# Patient Record
Sex: MALE | Race: WHITE | NOT HISPANIC OR LATINO | Employment: OTHER | URBAN - METROPOLITAN AREA
[De-identification: names, ages, dates, MRNs, and addresses within clinical notes are randomized per-mention and may not be internally consistent; named-entity substitution may affect disease eponyms.]

---

## 2018-09-11 ENCOUNTER — HOSPITAL ENCOUNTER (EMERGENCY)
Facility: HOSPITAL | Age: 43
Discharge: HOME | End: 2018-09-11
Attending: EMERGENCY MEDICINE | Admitting: EMERGENCY MEDICINE

## 2018-09-11 VITALS
HEART RATE: 80 BPM | SYSTOLIC BLOOD PRESSURE: 140 MMHG | OXYGEN SATURATION: 99 % | BODY MASS INDEX: 30.95 KG/M2 | WEIGHT: 228.5 LBS | HEIGHT: 72 IN | DIASTOLIC BLOOD PRESSURE: 92 MMHG | RESPIRATION RATE: 16 BRPM | TEMPERATURE: 98.2 F

## 2018-09-11 DIAGNOSIS — I47.1 SVT (SUPRAVENTRICULAR TACHYCARDIA) (HCC): Primary | ICD-10-CM

## 2018-09-11 LAB
ALBUMIN SERPL-MCNC: 4 G/DL (ref 3.5–5.2)
ALBUMIN/GLOB SERPL: 1.6 G/DL
ALP SERPL-CCNC: 73 U/L (ref 39–117)
ALT SERPL W P-5'-P-CCNC: 21 U/L (ref 1–41)
ANION GAP SERPL CALCULATED.3IONS-SCNC: 10 MMOL/L
AST SERPL-CCNC: 23 U/L (ref 1–40)
BASOPHILS # BLD AUTO: 0.05 10*3/MM3 (ref 0–0.2)
BASOPHILS NFR BLD AUTO: 0.4 % (ref 0–1.5)
BILIRUB SERPL-MCNC: 0.3 MG/DL (ref 0.1–1.2)
BUN BLD-MCNC: 11 MG/DL (ref 6–20)
BUN/CREAT SERPL: 10.5 (ref 7–25)
CALCIUM SPEC-SCNC: 8.7 MG/DL (ref 8.6–10.5)
CHLORIDE SERPL-SCNC: 108 MMOL/L (ref 98–107)
CO2 SERPL-SCNC: 25 MMOL/L (ref 22–29)
CREAT BLD-MCNC: 1.05 MG/DL (ref 0.76–1.27)
DEPRECATED RDW RBC AUTO: 43.1 FL (ref 37–54)
EOSINOPHIL # BLD AUTO: 0.17 10*3/MM3 (ref 0–0.7)
EOSINOPHIL NFR BLD AUTO: 1.4 % (ref 0.3–6.2)
ERYTHROCYTE [DISTWIDTH] IN BLOOD BY AUTOMATED COUNT: 12.5 % (ref 11.5–14.5)
GFR SERPL CREATININE-BSD FRML MDRD: 78 ML/MIN/1.73
GLOBULIN UR ELPH-MCNC: 2.5 GM/DL
GLUCOSE BLD-MCNC: 108 MG/DL (ref 65–99)
HCT VFR BLD AUTO: 42.6 % (ref 40.4–52.2)
HGB BLD-MCNC: 13.7 G/DL (ref 13.7–17.6)
IMM GRANULOCYTES # BLD: 0.07 10*3/MM3 (ref 0–0.03)
IMM GRANULOCYTES NFR BLD: 0.6 % (ref 0–0.5)
LYMPHOCYTES # BLD AUTO: 1.88 10*3/MM3 (ref 0.9–4.8)
LYMPHOCYTES NFR BLD AUTO: 15.9 % (ref 19.6–45.3)
MAGNESIUM SERPL-MCNC: 2.2 MG/DL (ref 1.6–2.6)
MCH RBC QN AUTO: 30.6 PG (ref 27–32.7)
MCHC RBC AUTO-ENTMCNC: 32.2 G/DL (ref 32.6–36.4)
MCV RBC AUTO: 95.1 FL (ref 79.8–96.2)
MONOCYTES # BLD AUTO: 0.71 10*3/MM3 (ref 0.2–1.2)
MONOCYTES NFR BLD AUTO: 6 % (ref 5–12)
NEUTROPHILS # BLD AUTO: 8.98 10*3/MM3 (ref 1.9–8.1)
NEUTROPHILS NFR BLD AUTO: 75.7 % (ref 42.7–76)
PLATELET # BLD AUTO: 269 10*3/MM3 (ref 140–500)
PMV BLD AUTO: 10.2 FL (ref 6–12)
POTASSIUM BLD-SCNC: 4.1 MMOL/L (ref 3.5–5.2)
PROT SERPL-MCNC: 6.5 G/DL (ref 6–8.5)
RBC # BLD AUTO: 4.48 10*6/MM3 (ref 4.6–6)
SODIUM BLD-SCNC: 143 MMOL/L (ref 136–145)
T4 FREE SERPL-MCNC: 0.82 NG/DL (ref 0.93–1.7)
TROPONIN T SERPL-MCNC: <0.01 NG/ML (ref 0–0.03)
TSH SERPL DL<=0.05 MIU/L-ACNC: 14.31 MIU/ML (ref 0.27–4.2)
WBC NRBC COR # BLD: 11.86 10*3/MM3 (ref 4.5–10.7)

## 2018-09-11 PROCEDURE — 84484 ASSAY OF TROPONIN QUANT: CPT | Performed by: PHYSICIAN ASSISTANT

## 2018-09-11 PROCEDURE — 80053 COMPREHEN METABOLIC PANEL: CPT | Performed by: PHYSICIAN ASSISTANT

## 2018-09-11 PROCEDURE — 84439 ASSAY OF FREE THYROXINE: CPT | Performed by: EMERGENCY MEDICINE

## 2018-09-11 PROCEDURE — 96374 THER/PROPH/DIAG INJ IV PUSH: CPT

## 2018-09-11 PROCEDURE — 99284 EMERGENCY DEPT VISIT MOD MDM: CPT

## 2018-09-11 PROCEDURE — 93010 ELECTROCARDIOGRAM REPORT: CPT | Performed by: INTERNAL MEDICINE

## 2018-09-11 PROCEDURE — 83735 ASSAY OF MAGNESIUM: CPT | Performed by: PHYSICIAN ASSISTANT

## 2018-09-11 PROCEDURE — 84443 ASSAY THYROID STIM HORMONE: CPT | Performed by: EMERGENCY MEDICINE

## 2018-09-11 PROCEDURE — 93005 ELECTROCARDIOGRAM TRACING: CPT

## 2018-09-11 PROCEDURE — 85025 COMPLETE CBC W/AUTO DIFF WBC: CPT | Performed by: PHYSICIAN ASSISTANT

## 2018-09-11 PROCEDURE — 96361 HYDRATE IV INFUSION ADD-ON: CPT

## 2018-09-11 PROCEDURE — 93005 ELECTROCARDIOGRAM TRACING: CPT | Performed by: EMERGENCY MEDICINE

## 2018-09-11 RX ORDER — LISINOPRIL 20 MG/1
20 TABLET ORAL 2 TIMES DAILY
COMMUNITY
End: 2018-09-11

## 2018-09-11 RX ORDER — METOPROLOL SUCCINATE 25 MG/1
25 TABLET, EXTENDED RELEASE ORAL DAILY
Qty: 30 TABLET | Refills: 0 | Status: SHIPPED | OUTPATIENT
Start: 2018-09-11 | End: 2018-09-21

## 2018-09-11 RX ORDER — LISINOPRIL 20 MG/1
20 TABLET ORAL 2 TIMES DAILY
Qty: 60 TABLET | Refills: 0 | Status: SHIPPED | OUTPATIENT
Start: 2018-09-11 | End: 2018-09-21

## 2018-09-11 RX ORDER — SODIUM CHLORIDE 0.9 % (FLUSH) 0.9 %
10 SYRINGE (ML) INJECTION AS NEEDED
Status: DISCONTINUED | OUTPATIENT
Start: 2018-09-11 | End: 2018-09-11 | Stop reason: HOSPADM

## 2018-09-11 RX ADMIN — METOPROLOL TARTRATE 5 MG: 1 INJECTION, SOLUTION INTRAVENOUS at 08:02

## 2018-09-11 RX ADMIN — SODIUM CHLORIDE 500 ML: 9 INJECTION, SOLUTION INTRAVENOUS at 08:01

## 2018-09-11 NOTE — ED PROVIDER NOTES
EMERGENCY DEPARTMENT ENCOUNTER    CHIEF COMPLAINT  Chief Complaint: Palpitations   History given by: patient   History limited by: n/a  Room Number: 12/12  PMD: Provider, No Known      HPI:  Pt is a 42 y.o. male who presents complaining of palpitations that began this morning at 02:30. Pt states that he has a hx of SVT and WPW, and is typically able to convert himself out of SVT with bearing down. Pt states that he attempted that this morning, but did not convert. Pt was given 12 mg of Adenosine with conversion. He denies associated CP, SOA, or any other sx. Pt states that he had 2 cappuccinos last night.     Duration:  3 hours   Onset: sudden  Timing: constant   Location: cardiac  Radiation: n/a  Quality: rapid HR  Intensity/Severity: moderate  Progression: resolved   Associated Symptoms: none  Aggravating Factors: caffeine   Alleviating Factors: Adenosine  Previous Episodes: Hx of SVT and WPW  Treatment before arrival: 12 mg of Adenosine.     PAST MEDICAL HISTORY  Active Ambulatory Problems     Diagnosis Date Noted   • No Active Ambulatory Problems     Resolved Ambulatory Problems     Diagnosis Date Noted   • No Resolved Ambulatory Problems     Past Medical History:   Diagnosis Date   • Hypertension    • SVT (supraventricular tachycardia) (CMS/HCC)    • WPW (Elinor-Parkinson-White syndrome)        PAST SURGICAL HISTORY  Past Surgical History:   Procedure Laterality Date   • FEMUR SURGERY         FAMILY HISTORY  History reviewed. No pertinent family history.    SOCIAL HISTORY  Social History     Social History   • Marital status: Single     Spouse name: N/A   • Number of children: N/A   • Years of education: N/A     Occupational History   • Not on file.     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    REVIEW OF SYSTEMS  Review of Systems   Constitutional: Negative for activity change, appetite change and fever.   HENT: Negative for congestion and sore throat.    Eyes: Negative.    Respiratory: Negative for cough and shortness of breath.    Cardiovascular: Positive for palpitations. Negative for chest pain and leg swelling.   Gastrointestinal: Negative for abdominal pain, diarrhea and vomiting.   Endocrine: Negative.    Genitourinary: Negative for decreased urine volume and dysuria.   Musculoskeletal: Negative for neck pain.   Skin: Negative for rash and wound.   Allergic/Immunologic: Negative.    Neurological: Negative for weakness, numbness and headaches.   Hematological: Negative.    Psychiatric/Behavioral: Negative.    All other systems reviewed and are negative.      PHYSICAL EXAM  ED Triage Vitals   Temp Heart Rate Resp BP SpO2   09/11/18 0634 09/11/18 0632 09/11/18 0632 09/11/18 0632 09/11/18 0632   98.3 °F (36.8 °C) 110 18 (!) 139/106 98 %      Temp src Heart Rate Source Patient Position BP Location FiO2 (%)   -- 09/11/18 0632 -- -- --    Monitor          Physical Exam   Constitutional: He is oriented to person, place, and time. No distress.   HENT:   Head: Normocephalic and atraumatic.   Eyes: Pupils are equal, round, and reactive to light. EOM are normal.   Neck: Normal range of motion. Neck supple.   Cardiovascular: Regular rhythm and normal heart sounds.  Tachycardia present.    HR- 110's   Pulmonary/Chest: Effort normal and breath sounds normal. No respiratory distress.   Abdominal: Soft. There is no tenderness. There is no rebound and no guarding.   Musculoskeletal: Normal range of motion. He exhibits no edema or tenderness (calf).   Neurological: He is alert and oriented to person, place, and time. He has normal sensation and normal strength.   Skin: Skin is warm and dry.   Psychiatric:  Mood and affect normal.   Nursing note and vitals reviewed.      LAB RESULTS  Lab Results (last 24 hours)     Procedure Component Value Units Date/Time    CBC & Differential [[id number]] Collected:  09/11/18 0648    Specimen:  Blood Updated:  09/11/18 0658    Narrative:       The following orders were created for panel order CBC & Differential.  Procedure                               Abnormality         Status                     ---------                               -----------         ------                     CBC Auto Differential[[id number]]        Abnormal            Final result                 Please view results for these tests on the individual orders.    Comprehensive Metabolic Panel [[id number]]  (Abnormal) Collected:  09/11/18 0648    Specimen:  Blood Updated:  09/11/18 0734     Glucose 108 (H) mg/dL      BUN 11 mg/dL      Creatinine 1.05 mg/dL      Sodium 143 mmol/L      Potassium 4.1 mmol/L      Chloride 108 (H) mmol/L      CO2 25.0 mmol/L      Calcium 8.7 mg/dL      Total Protein 6.5 g/dL      Albumin 4.00 g/dL      ALT (SGPT) 21 U/L      AST (SGOT) 23 U/L      Alkaline Phosphatase 73 U/L      Total Bilirubin 0.3 mg/dL      eGFR Non African Amer 78 mL/min/1.73      Globulin 2.5 gm/dL      A/G Ratio 1.6 g/dL      BUN/Creatinine Ratio 10.5     Anion Gap 10.0 mmol/L     Troponin [[id number]]  (Normal) Collected:  09/11/18 0648    Specimen:  Blood Updated:  09/11/18 0721     Troponin T <0.010 ng/mL     Narrative:       Troponin T Reference Ranges:  Less than 0.03 ng/mL:    Negative for AMI  0.03 to 0.09 ng/mL:      Indeterminant for AMI  Greater than 0.09 ng/mL: Positive for AMI    Magnesium [[id number]]  (Normal) Collected:  09/11/18 0648    Specimen:  Blood Updated:  09/11/18 0734     Magnesium 2.2 mg/dL     CBC Auto Differential [[id number]]  (Abnormal) Collected:  09/11/18 0648    Specimen:  Blood Updated:  09/11/18 0658     WBC 11.86 (H) 10*3/mm3      RBC 4.48 (L) 10*6/mm3      Hemoglobin 13.7 g/dL       Hematocrit 42.6 %      MCV 95.1 fL      MCH 30.6 pg      MCHC 32.2 (L) g/dL      RDW 12.5 %      RDW-SD 43.1 fl      MPV 10.2 fL      Platelets 269 10*3/mm3      Neutrophil % 75.7 %      Lymphocyte % 15.9 (L) %      Monocyte % 6.0 %      Eosinophil % 1.4 %      Basophil % 0.4 %      Immature Grans % 0.6 (H) %      Neutrophils, Absolute 8.98 (H) 10*3/mm3      Lymphocytes, Absolute 1.88 10*3/mm3      Monocytes, Absolute 0.71 10*3/mm3      Eosinophils, Absolute 0.17 10*3/mm3      Basophils, Absolute 0.05 10*3/mm3      Immature Grans, Absolute 0.07 (H) 10*3/mm3     TSH [[id number]]  (Abnormal) Collected:  09/11/18 0648    Specimen:  Blood Updated:  09/11/18 0813     TSH 14.310 (H) mIU/mL     T4, Free [[id number]]  (Abnormal) Collected:  09/11/18 0648    Specimen:  Blood Updated:  09/11/18 0813     Free T4 0.82 (L) ng/dL           I ordered the above labs and reviewed the results    PROCEDURES  Procedures    EKG    EKG time: 06;40  Rhythm/Rate: Sinus tachycardia 103  Frequent PVC's  No Acute Ischemia  Non-Specific ST-T changes    No prior   Interpreted Contemporaneously by me.  Independently viewed by me    PROGRESS AND CONSULTS       06:41  Troponin, CBC, CMP, EKG, and magnesium ordered.     07:14  BP- (!) 139/106 HR- 110 Temp- 98.3 °F (36.8 °C) O2 sat- 98%  Informed pt of the plan for labs, IVF, and beta-blocker to control HR. Pt understands and agrees with the plan, all questions answered.    07:24  IVF ordered for hydration. Lopressor ordered to treat tachycardia. TSH and T4 ordered.     08:22  BP- 123/88 HR- 84 (NSR) Temp- 98.3 °F (36.8 °C) O2 sat- 96%  Rechecked the patient who is in NAD and is resting comfortably. Pt has remained in NSR. Informed pt that the workup in the ED shows NAD. Plan for d/c with rx for Metoprolol. Pt also requesting a refill of his Lisinopril. Pt states that he is new to the area so I will provide a referral to cardiology for f/u. Pt understands and agrees with the plan, all questions  answered.    MEDICAL DECISION MAKING  Results were reviewed/discussed with the patient and they were also made aware of online access. Pt also made aware that some labs, such as cultures, will not be resulted during ER visit and follow up with PMD is necessary.     MDM  Number of Diagnoses or Management Options  SVT (supraventricular tachycardia) (CMS/[facility]):      Amount and/or Complexity of Data Reviewed  Clinical lab tests: ordered and reviewed (Troponin is <0.010)  Tests in the medicine section of CPT®: ordered and reviewed (See EKG procedure note. )    Patient Progress  Patient progress: stable         DIAGNOSIS  Final diagnoses:   SVT (supraventricular tachycardia) (CMS/HCC)       DISPOSITION  DISCHARGE    Patient discharged in stable condition.    Reviewed implications of results, diagnosis, meds, responsibility to follow up, warning signs and symptoms of possible worsening, potential complications and reasons to return to ER.    Patient/Family voiced understanding of above instructions.    Discussed plan for discharge, as there is no emergent indication for admission. Patient referred to primary care provider for BP management due to today's BP. Pt/family is agreeable and understands need for follow up and repeat testing.  Pt is aware that discharge does not mean that nothing is wrong but it indicates no emergency is present that requires admission and they must continue care with follow-up as given below or physician of their choice.     FOLLOW-UP  Kannan Mccormick MD  [address] [id number] [phone]    In 1 week  For recheck         Medication List      New Prescriptions    metoprolol succinate XL 25 MG 24 hr tablet  Commonly known as:  TOPROL-XL  Take 1 tablet by mouth Daily.          Latest Documented Vital Signs:  As of 8:32 AM  BP- 123/88 HR- 91 Temp- 98.3 °F (36.8 °C) O2 sat- 96%    --  Documentation assistance provided by pranav Khan for Dr Shore.  Information  recorded by the scribe was done at my direction and has been verified and validated by me.        Seema Khan  09/11/18 1028       Denis Shore MD  09/11/18 0409

## 2018-09-11 NOTE — ED TRIAGE NOTES
To ER via EMS.  Pt states he went into SVT approx 0230 this am.  States he has had it before but was unable to convert himself this am.  Pt called EMS approx 0530.  HR was approx 207 per EMS. Adenosine 12mg IV given by EMS with conversion to ST.  PT with hx of WPW and SVT.

## 2018-09-21 ENCOUNTER — HOSPITAL ENCOUNTER (EMERGENCY)
Facility: HOSPITAL | Age: 43
Discharge: HOME | End: 2018-09-21
Attending: EMERGENCY MEDICINE | Admitting: EMERGENCY MEDICINE

## 2018-09-21 VITALS
SYSTOLIC BLOOD PRESSURE: 153 MMHG | WEIGHT: 208 LBS | RESPIRATION RATE: 18 BRPM | OXYGEN SATURATION: 97 % | TEMPERATURE: 98 F | DIASTOLIC BLOOD PRESSURE: 109 MMHG | BODY MASS INDEX: 28.17 KG/M2 | HEIGHT: 72 IN | HEART RATE: 94 BPM

## 2018-09-21 DIAGNOSIS — K08.89 TOOTHACHE: ICD-10-CM

## 2018-09-21 DIAGNOSIS — K04.7 PERIAPICAL ABSCESS: Primary | ICD-10-CM

## 2018-09-21 PROCEDURE — 99283 EMERGENCY DEPT VISIT LOW MDM: CPT

## 2018-09-21 RX ORDER — AMOXICILLIN 875 MG/1
875 TABLET, COATED ORAL ONCE
Status: COMPLETED | OUTPATIENT
Start: 2018-09-21 | End: 2018-09-21

## 2018-09-21 RX ORDER — METOPROLOL SUCCINATE 25 MG/1
25 TABLET, EXTENDED RELEASE ORAL DAILY
Qty: 30 TABLET | Refills: 0 | Status: SHIPPED | OUTPATIENT
Start: 2018-09-21 | End: 2018-10-13

## 2018-09-21 RX ORDER — LISINOPRIL 20 MG/1
20 TABLET ORAL 2 TIMES DAILY
Qty: 60 TABLET | Refills: 0 | Status: SHIPPED | OUTPATIENT
Start: 2018-09-21 | End: 2018-09-21 | Stop reason: SDDI

## 2018-09-21 RX ORDER — PENICILLIN V POTASSIUM 500 MG/1
500 TABLET ORAL 4 TIMES DAILY
Qty: 40 TABLET | Refills: 0 | Status: SHIPPED | OUTPATIENT
Start: 2018-09-21 | End: 2018-10-13

## 2018-09-21 RX ADMIN — AMOXICILLIN 875 MG: 875 TABLET, FILM COATED ORAL at 23:05

## 2018-09-22 NOTE — ED NOTES
"Pt reports having left lower dental pain that started \"a few days ago\" Pt reports that his tooth has been loose and his he \"can taste the infection\" Pt reports having hypertension and states that he lost his prescription for lopressor. Pt alert and oriented. Call light within reach. Will continue to monitor.      Beata Matta RN  09/21/18 0297    "

## 2018-09-22 NOTE — DISCHARGE INSTRUCTIONS
Medicine as prescribed, tylenol or ibuprofen as needed, follow up with dentist for recheck. Return to care with further concerns.

## 2018-09-22 NOTE — ED PROVIDER NOTES
" EMERGENCY DEPARTMENT ENCOUNTER    Room Number:  27/27  Time seen: 10:18 PM  PCP: Provider, No Known  Historian: patient  History Limited By: N/A      HPI:  Chief Complaint: dental pain  Context: Juanito Servin is a 42 y.o. male who states that one of his left lower teeth has been loose for \"a while\". Today, he had onset of pain to the left lower tooth. It has been accompanied by left lower gum swelling. He denies fever, trouble swallowing, sore throat, and N/V/D. Pt has no other complaints at this time.     Location: left lower dentition   Radiation: none  Quality: pain  Intensity/Severity: moderate  Duration: started today  Onset quality: gradual   Timing: constant   Progression: unchanged  Aggravating Factors: none  Alleviating Factors: none  Previous Episodes: none mentioned  Treatment before arrival: none mentioned  Associated Symptoms: left lower gum swelling        PAST MEDICAL HISTORY  Active Ambulatory Problems     Diagnosis Date Noted   • No Active Ambulatory Problems     Resolved Ambulatory Problems     Diagnosis Date Noted   • No Resolved Ambulatory Problems     Past Medical History:   Diagnosis Date   • Hypertension    • SVT (supraventricular tachycardia) (CMS/HCC)    • WPW (Elinor-Parkinson-White syndrome)          PAST SURGICAL HISTORY  Past Surgical History:   Procedure Laterality Date   • FEMUR SURGERY           FAMILY HISTORY  History reviewed. No pertinent family history.      SOCIAL HISTORY  Social History     Social History   • Marital status: Single     Spouse name: N/A   • Number of children: N/A   • Years of education: N/A     Occupational History   • Not on file.     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      REVIEW OF SYSTEMS  Review of Systems   Constitutional: Negative for fever.   HENT: Positive for dental problem (left lower dental pain). Negative for sore throat.         Left lower gum swelling   Gastrointestinal: Negative for diarrhea, nausea and vomiting.   Genitourinary: Negative for dysuria.   Musculoskeletal: Negative for back pain.   Skin: Negative for rash.   Psychiatric/Behavioral: The patient is not nervous/anxious.    All other systems reviewed and are negative.           PHYSICAL EXAM  ED Triage Vitals   Temp Heart Rate Resp BP SpO2   09/21/18 2211 09/21/18 2211 09/21/18 2211 09/21/18 2217 09/21/18 2211   98 °F (36.7 °C) 114 18 (!) 182/110 97 % WNL      Temp src Heart Rate Source Patient Position BP Location FiO2 (%)   09/21/18 2211 09/21/18 2211 -- -- --   Tympanic Monitor          Physical Exam   Constitutional: He is oriented to person, place, and time. No distress.   HENT:   Head: Normocephalic.   Mouth/Throat: No oropharyngeal exudate, posterior oropharyngeal edema or posterior oropharyngeal erythema.   Tooth #20 is tender to palpation and has associated gum tenderness and gum swelling    Neck: Normal range of motion. Neck supple.   No meningeal signs, no nuchal rigidity   Cardiovascular: Normal rate, regular rhythm and normal heart sounds.    Pulmonary/Chest: Effort normal and breath sounds normal. No respiratory distress. He has no wheezes. He has no rhonchi. He has no rales.   Lymphadenopathy:     He has no cervical adenopathy.   Neurological: He is alert and oriented to person, place, and time. He has normal motor skills and normal sensation.   Skin: Skin is warm and dry.   Nursing note and vitals reviewed.          PROCEDURES  Procedures          PROGRESS AND CONSULTS     2221- Discussed with pt that clinical picture is concerning for dental infection for which he will be prescribed abx. Pt states that when he was seen at [facility] ED on 9/11/18  for SVT, he was prescribed toprol XL but lost the prescription. He also states that he ran out of his lisinopril and would like for it to be represcribed. Informed pt that we will reprint the toprol XL prescription and represcribe his lisinopril. Instructed to f/u closely with referred dentist for recheck and for further management. RTER warnings given. Pt understands and agrees with plan. Addressed all questions.    2234- Discussed case with Dr Vieyra who agrees with the plan of care.     2248- Ordered amoxicillin for dental infection.         MEDICAL DECISION MAKING      MDM  Number of Diagnoses or Management Options  Periapical abscess:   Toothache:      Amount and/or Complexity of Data Reviewed  Decide to obtain previous medical records or to obtain history from someone other than the patient: yes  Review and summarize past medical records: yes (Pt was seen at [facility] ED on 9/11/18 for SVT and was discharged with prescription for toprol XL.)    Patient Progress  Patient progress: stable             DIAGNOSIS  Final diagnoses:   Periapical abscess   Toothache         DISPOSITION  DISCHARGE    Patient discharged in stable condition.    Reviewed implications of diagnosis, meds, responsibility to follow up, warning signs and symptoms of possible worsening, potential complications and reasons to return to ER.    Patient/Family voiced understanding of above instructions.    Discussed plan for discharge, as there is no emergent indication for admission.  Pt/family is agreeable and understands need for follow up and repeat testing.  Pt is aware that discharge does not mean that nothing is wrong but it indicates no emergency is present and they must continue care with follow-up as given below or physician of their choice.     FOLLOW-UP  [facility] [employer] [facility]  [address] [place] [id number]  [phone]        [facility]  [address] [place] [address]  [id number]  [phone]          DISCHARGE MEDICATIONS     Medication List      New Prescriptions    penicillin v potassium 500 MG tablet  Commonly known as:  VEETID  Take 1 tablet by mouth 4 (Four) Times a Day.            Latest Documented Vital Signs:  As of 11:10 PM  BP- 153/109 HR- 94 Temp- 98 °F (36.7 °C) (Tympanic) O2 sat- 97%        --  Documentation assistance provided by pranav Pizano for MAURO Siu.  Information recorded by the scribe was done at my direction and has been verified and validated by me.       Estuardo Pizano  09/21/18 4186       Jayden Siu PA  09/21/18 1973

## 2018-09-22 NOTE — ED TRIAGE NOTES
TO ER via PV. C/o pain to left lower molar and swelling to left jaw.  Pain/swelling started today.

## 2018-10-12 ENCOUNTER — APPOINTMENT (OUTPATIENT)
Dept: GENERAL RADIOLOGY | Facility: HOSPITAL | Age: 43
End: 2018-10-12

## 2018-10-12 ENCOUNTER — HOSPITAL ENCOUNTER (EMERGENCY)
Facility: HOSPITAL | Age: 43
Discharge: HOME | End: 2018-10-13
Attending: EMERGENCY MEDICINE | Admitting: EMERGENCY MEDICINE

## 2018-10-12 DIAGNOSIS — I47.1 PSVT (PAROXYSMAL SUPRAVENTRICULAR TACHYCARDIA) (HCC): Primary | ICD-10-CM

## 2018-10-12 PROCEDURE — 84484 ASSAY OF TROPONIN QUANT: CPT | Performed by: EMERGENCY MEDICINE

## 2018-10-12 PROCEDURE — 85025 COMPLETE CBC W/AUTO DIFF WBC: CPT | Performed by: EMERGENCY MEDICINE

## 2018-10-12 PROCEDURE — 93010 ELECTROCARDIOGRAM REPORT: CPT | Performed by: INTERNAL MEDICINE

## 2018-10-12 PROCEDURE — 99284 EMERGENCY DEPT VISIT MOD MDM: CPT

## 2018-10-12 PROCEDURE — 93005 ELECTROCARDIOGRAM TRACING: CPT | Performed by: EMERGENCY MEDICINE

## 2018-10-12 PROCEDURE — 80053 COMPREHEN METABOLIC PANEL: CPT | Performed by: EMERGENCY MEDICINE

## 2018-10-12 PROCEDURE — 96374 THER/PROPH/DIAG INJ IV PUSH: CPT

## 2018-10-13 ENCOUNTER — APPOINTMENT (OUTPATIENT)
Dept: GENERAL RADIOLOGY | Facility: HOSPITAL | Age: 43
End: 2018-10-13

## 2018-10-13 VITALS
RESPIRATION RATE: 22 BRPM | HEIGHT: 72 IN | OXYGEN SATURATION: 96 % | BODY MASS INDEX: 32 KG/M2 | HEART RATE: 105 BPM | WEIGHT: 236.3 LBS | DIASTOLIC BLOOD PRESSURE: 103 MMHG | SYSTOLIC BLOOD PRESSURE: 151 MMHG | TEMPERATURE: 98.9 F

## 2018-10-13 VITALS
TEMPERATURE: 98.9 F | HEART RATE: 109 BPM | WEIGHT: 236.3 LBS | SYSTOLIC BLOOD PRESSURE: 132 MMHG | DIASTOLIC BLOOD PRESSURE: 83 MMHG | OXYGEN SATURATION: 100 % | RESPIRATION RATE: 16 BRPM | BODY MASS INDEX: 32 KG/M2 | HEIGHT: 72 IN

## 2018-10-13 DIAGNOSIS — I47.1 SVT (SUPRAVENTRICULAR TACHYCARDIA) (HCC): Primary | ICD-10-CM

## 2018-10-13 LAB
ALBUMIN SERPL-MCNC: 3.6 G/DL (ref 3.5–5.2)
ALBUMIN/GLOB SERPL: 1.2 G/DL
ALP SERPL-CCNC: 88 U/L (ref 39–117)
ALT SERPL W P-5'-P-CCNC: 21 U/L (ref 1–41)
ANION GAP SERPL CALCULATED.3IONS-SCNC: 8.3 MMOL/L
AST SERPL-CCNC: 44 U/L (ref 1–40)
BASOPHILS # BLD AUTO: 0.07 10*3/MM3 (ref 0–0.2)
BASOPHILS NFR BLD AUTO: 0.6 % (ref 0–1.5)
BILIRUB SERPL-MCNC: 0.2 MG/DL (ref 0.1–1.2)
BUN BLD-MCNC: 16 MG/DL (ref 6–20)
BUN/CREAT SERPL: 12.1 (ref 7–25)
CALCIUM SPEC-SCNC: 8.6 MG/DL (ref 8.6–10.5)
CHLORIDE SERPL-SCNC: 105 MMOL/L (ref 98–107)
CO2 SERPL-SCNC: 29.7 MMOL/L (ref 22–29)
CREAT BLD-MCNC: 1.32 MG/DL (ref 0.76–1.27)
DEPRECATED RDW RBC AUTO: 42.1 FL (ref 37–54)
EOSINOPHIL # BLD AUTO: 0.29 10*3/MM3 (ref 0–0.7)
EOSINOPHIL NFR BLD AUTO: 2.4 % (ref 0.3–6.2)
ERYTHROCYTE [DISTWIDTH] IN BLOOD BY AUTOMATED COUNT: 12.4 % (ref 11.5–14.5)
GFR SERPL CREATININE-BSD FRML MDRD: 60 ML/MIN/1.73
GLOBULIN UR ELPH-MCNC: 2.9 GM/DL
GLUCOSE BLD-MCNC: 105 MG/DL (ref 65–99)
HCT VFR BLD AUTO: 41.2 % (ref 40.4–52.2)
HGB BLD-MCNC: 14.1 G/DL (ref 13.7–17.6)
IMM GRANULOCYTES # BLD: 0.2 10*3/MM3 (ref 0–0.03)
IMM GRANULOCYTES NFR BLD: 1.7 % (ref 0–0.5)
LYMPHOCYTES # BLD AUTO: 1.95 10*3/MM3 (ref 0.9–4.8)
LYMPHOCYTES NFR BLD AUTO: 16.1 % (ref 19.6–45.3)
MCH RBC QN AUTO: 32.3 PG (ref 27–32.7)
MCHC RBC AUTO-ENTMCNC: 34.2 G/DL (ref 32.6–36.4)
MCV RBC AUTO: 94.5 FL (ref 79.8–96.2)
MONOCYTES # BLD AUTO: 0.52 10*3/MM3 (ref 0.2–1.2)
MONOCYTES NFR BLD AUTO: 4.3 % (ref 5–12)
NEUTROPHILS # BLD AUTO: 9.28 10*3/MM3 (ref 1.9–8.1)
NEUTROPHILS NFR BLD AUTO: 76.6 % (ref 42.7–76)
PLATELET # BLD AUTO: 245 10*3/MM3 (ref 140–500)
PMV BLD AUTO: 10.7 FL (ref 6–12)
POTASSIUM BLD-SCNC: 4.7 MMOL/L (ref 3.5–5.2)
PROT SERPL-MCNC: 6.5 G/DL (ref 6–8.5)
RBC # BLD AUTO: 4.36 10*6/MM3 (ref 4.6–6)
SODIUM BLD-SCNC: 143 MMOL/L (ref 136–145)
TROPONIN T SERPL-MCNC: <0.01 NG/ML (ref 0–0.03)
WBC NRBC COR # BLD: 12.11 10*3/MM3 (ref 4.5–10.7)

## 2018-10-13 PROCEDURE — 93005 ELECTROCARDIOGRAM TRACING: CPT

## 2018-10-13 PROCEDURE — 96375 TX/PRO/DX INJ NEW DRUG ADDON: CPT

## 2018-10-13 PROCEDURE — 93010 ELECTROCARDIOGRAM REPORT: CPT | Performed by: INTERNAL MEDICINE

## 2018-10-13 PROCEDURE — 71046 X-RAY EXAM CHEST 2 VIEWS: CPT

## 2018-10-13 PROCEDURE — 96374 THER/PROPH/DIAG INJ IV PUSH: CPT

## 2018-10-13 PROCEDURE — 99283 EMERGENCY DEPT VISIT LOW MDM: CPT

## 2018-10-13 PROCEDURE — 93005 ELECTROCARDIOGRAM TRACING: CPT | Performed by: EMERGENCY MEDICINE

## 2018-10-13 PROCEDURE — 25010000002 ADENOSINE PER 6 MG

## 2018-10-13 RX ORDER — ADENOSINE 3 MG/ML
12 INJECTION, SOLUTION INTRAVENOUS ONCE
Status: COMPLETED | OUTPATIENT
Start: 2018-10-13 | End: 2018-10-13

## 2018-10-13 RX ORDER — METOPROLOL SUCCINATE 25 MG/1
50 TABLET, EXTENDED RELEASE ORAL DAILY
Qty: 30 TABLET | Refills: 0 | Status: SHIPPED | OUTPATIENT
Start: 2018-10-13 | End: 2018-10-17 | Stop reason: HOSPADM

## 2018-10-13 RX ORDER — ADENOSINE 3 MG/ML
INJECTION, SOLUTION INTRAVENOUS
Status: COMPLETED
Start: 2018-10-13 | End: 2018-10-13

## 2018-10-13 RX ADMIN — METOPROLOL TARTRATE 5 MG: 1 INJECTION, SOLUTION INTRAVENOUS at 00:31

## 2018-10-13 RX ADMIN — ADENOSINE 12 MG: 3 INJECTION, SOLUTION INTRAVENOUS at 03:08

## 2018-10-13 RX ADMIN — METOPROLOL TARTRATE 5 MG: 1 INJECTION, SOLUTION INTRAVENOUS at 03:16

## 2018-10-13 NOTE — ED PROVIDER NOTES
" EMERGENCY DEPARTMENT ENCOUNTER    CHIEF COMPLAINT  Chief Complaint: palpitations   History given by: patient   History limited by: n/a  Room Number: 18/18  PMD: Provider, No Known      HPI:  Pt is a 42 y.o. male who presents for evaluation of palpitations that began just prior to arrival. He describes the palpitations as a \"fluttering\" sensation. Pt was just discharged from the ED after being seen for SVT. Pt converted with EMS after 12 mg of adenosine. Pt has no other complaints at this time.  Pt is on Metoprolol 25 mg daily to treat SVT, which was increased to 50 mg at the time of d/c.      Duration:  Prior to arrival   Onset: sudden  Timing: constant   Location: cardiac   Radiation: n/a  Quality: \"fluttering\"  Intensity/Severity: moderate  Progression: unchanged   Associated Symptoms: none  Aggravating Factors: none  Alleviating Factors: none    PAST MEDICAL HISTORY  Active Ambulatory Problems     Diagnosis Date Noted   • No Active Ambulatory Problems     Resolved Ambulatory Problems     Diagnosis Date Noted   • No Resolved Ambulatory Problems     Past Medical History:   Diagnosis Date   • Hypertension    • SVT (supraventricular tachycardia) (CMS/HCC)    • WPW (Elinor-Parkinson-White syndrome)        PAST SURGICAL HISTORY  Past Surgical History:   Procedure Laterality Date   • FEMUR SURGERY         FAMILY HISTORY  History reviewed. No pertinent family history.    SOCIAL HISTORY  Social History     Social History   • Marital status: Single     Spouse name: N/A   • Number of children: N/A   • Years of education: N/A     Occupational History   • Not on file.     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    REVIEW OF SYSTEMS  Review of Systems   Constitutional: Negative for activity change, appetite change and fever.   HENT: Negative for congestion and sore throat.    Eyes: Negative.    Respiratory: Negative for cough and shortness of breath.    Cardiovascular: Positive for palpitations. Negative for chest pain and leg swelling.   Gastrointestinal: Negative for abdominal pain, diarrhea and vomiting.   Endocrine: Negative.    Genitourinary: Negative for decreased urine volume and dysuria.   Musculoskeletal: Negative for neck pain.   Skin: Negative for rash and wound.   Allergic/Immunologic: Negative.    Neurological: Negative for weakness, numbness and headaches.   Hematological: Negative.    Psychiatric/Behavioral: Negative.    All other systems reviewed and are negative.      PHYSICAL EXAM  ED Triage Vitals   Temp Pulse Resp BP SpO2   -- -- -- -- --      Temp src Heart Rate Source Patient Position BP Location FiO2 (%)   -- -- -- -- --       Physical Exam   Constitutional: He is oriented to person, place, and time. No distress.   HENT:   Head: Normocephalic and atraumatic.   Eyes: Pupils are equal, round, and reactive to light. EOM are normal.   Neck: Normal range of motion. Neck supple.   Cardiovascular: Regular rhythm and normal heart sounds.  Tachycardia present.    Pulmonary/Chest: Effort normal and breath sounds normal. No respiratory distress.   Abdominal: Soft. There is no tenderness. There is no rebound and no guarding.   Musculoskeletal: Normal range of motion. He exhibits no edema.   Neurological: He is alert and oriented to person, place, and time. He has normal sensation and normal strength.   Skin: Skin is warm and dry.   Psychiatric: Mood and affect normal.   Nursing note and vitals reviewed.      LAB RESULTS  Lab Results (last 24 hours)     Procedure Component Value Units Date/Time    CBC & Differential [[id number]] Collected:  10/12/18 6823    Specimen:   Blood Updated:  10/13/18 0049    Narrative:       The following orders were created for panel order CBC & Differential.  Procedure                               Abnormality         Status                     ---------                               -----------         ------                     Scan Slide[[id number]]                                                                  CBC Auto Differential[[id number]]        Abnormal            Final result                 Please view results for these tests on the individual orders.    Comprehensive Metabolic Panel [[id number]]  (Abnormal) Collected:  10/12/18 2359    Specimen:  Blood Updated:  10/13/18 0049     Glucose 105 (H) mg/dL      BUN 16 mg/dL      Creatinine 1.32 (H) mg/dL      Sodium 143 mmol/L      Potassium 4.7 mmol/L      Comment: Specimen hemolyzed.  Results may be affected.        Chloride 105 mmol/L      CO2 29.7 (H) mmol/L      Calcium 8.6 mg/dL      Total Protein 6.5 g/dL      Albumin 3.60 g/dL      ALT (SGPT) 21 U/L      Comment: Specimen hemolyzed.  Results may be affected.        AST (SGOT) 44 (H) U/L      Comment: Specimen hemolyzed.  Results may be affected.        Alkaline Phosphatase 88 U/L      Total Bilirubin 0.2 mg/dL      eGFR Non African Amer 60 (L) mL/min/1.73      Globulin 2.9 gm/dL      A/G Ratio 1.2 g/dL      BUN/Creatinine Ratio 12.1     Anion Gap 8.3 mmol/L     Troponin [[id number]]  (Normal) Collected:  10/12/18 2359    Specimen:  Blood Updated:  10/13/18 0037     Troponin T <0.010 ng/mL      Comment: Specimen hemolyzed.  Results may be affected.       Narrative:       Troponin T Reference Ranges:  Less than 0.03 ng/mL:    Negative for AMI  0.03 to 0.09 ng/mL:      Indeterminant for AMI  Greater than 0.09 ng/mL: Positive for AMI    CBC Auto Differential [[id number]]  (Abnormal) Collected:  10/12/18 2359    Specimen:  Blood Updated:  10/13/18 0049     WBC 12.11 (H) 10*3/mm3      RBC 4.36 (L) 10*6/mm3      Hemoglobin 14.1 g/dL       Hematocrit 41.2 %      MCV 94.5 fL      MCH 32.3 pg      MCHC 34.2 g/dL      RDW 12.4 %      RDW-SD 42.1 fl      MPV 10.7 fL      Platelets 245 10*3/mm3      Neutrophil % 76.6 (H) %      Lymphocyte % 16.1 (L) %      Monocyte % 4.3 (L) %      Eosinophil % 2.4 %      Basophil % 0.6 %      Immature Grans % 1.7 (H) %      Neutrophils, Absolute 9.28 (H) 10*3/mm3      Lymphocytes, Absolute 1.95 10*3/mm3      Monocytes, Absolute 0.52 10*3/mm3      Eosinophils, Absolute 0.29 10*3/mm3      Basophils, Absolute 0.07 10*3/mm3      Immature Grans, Absolute 0.20 (H) 10*3/mm3           I ordered the above labs and reviewed the results    PROCEDURES  Procedures    EKG          Initial EKG interpretation time: 02:58  EKG time: 02:55  Rhythm/Rate:   ST and T waves: diffuse ST depression    Interpreted Contemporaneously by me, independently viewed  No prior     PROGRESS AND CONSULTS       03:06  BP- (!) 141/107 HR- (!) 192  Informed pt of the plan for adenosine. Pt was just seen in the ED, repeat labs are not indicated. Pt understands and agrees with the plan, all questions answered.    03:07  12 mg of adenosine given. Pt converted to sinus tachycardia. 5mg of Lopressor ordered.     03:14  BP- (!) 138/109 HR- 115  O2 sat- 96%  Rechecked the patient. Pt reports improvement after cardioversion. Informed pt of the plan to monitor in the ED. Pt understands and agrees with the plan, all questions answered.    04:24  BP- 141/88 HR- 101 Temp- 98.9 °F (37.2 °C) (Oral) O2 sat- 96%  Rechecked the patient who is in NAD and is resting comfortably. Informed pt of the plan for d/c with cardiology f/u. Pt instructed to avoid caffeine. Pt understands and agrees with the plan, all questions answered.    MEDICAL DECISION MAKING  Results were reviewed/discussed with the patient and they were also made aware of online access. Pt also made aware that some labs, such as cultures, will not be resulted during ER visit and follow up with PMD is  necessary.     MDM  Number of Diagnoses or Management Options     Amount and/or Complexity of Data Reviewed  Tests in the medicine section of CPT®: ordered and reviewed (See EKG procedure note. )           DIAGNOSIS  Final diagnoses:   SVT (supraventricular tachycardia) (CMS/[facility])       DISPOSITION  DISCHARGE    Patient discharged in stable condition.    Reviewed implications of results, diagnosis, meds, responsibility to follow up, warning signs and symptoms of possible worsening, potential complications and reasons to return to ER.    Patient/Family voiced understanding of above instructions.    Discussed plan for discharge, as there is no emergent indication for admission. Patient referred to primary care provider for BP management due to today's BP. Pt/family is agreeable and understands need for follow up and repeat testing.  Pt is aware that discharge does not mean that nothing is wrong but it indicates no emergency is present that requires admission and they must continue care with follow-up as given below or physician of their choice.     FOLLOW-UP  [facility] CARDIOLOGY  [address]  [facility] [id number] [phone]  Schedule an appointment as soon as possible for a visit            Medication List      No changes were made to your prescriptions during this visit.       Latest Documented Vital Signs:  As of 4:33 AM  BP- 141/88 HR- 101 Temp- 98.9 °F (37.2 °C) (Oral) O2 sat- 96%    --  Documentation assistance provided by pranav Khan for Dr Francisco.  Information recorded by the pranav was done at my direction and has been verified and validated by me.        Seema Khan  10/13/18 5913       José Francisco MD  10/13/18 4256

## 2018-10-13 NOTE — ED NOTES
#18g LAC PIV removed per protocol; tip intact; pt tolerated well      Kristel Nova, RN  10/13/18 8458

## 2018-10-13 NOTE — ED PROVIDER NOTES
" EMERGENCY DEPARTMENT ENCOUNTER    Room Number:  24/24  PCP: Provider, No Known  Historian: Patient, Friend      HPI  Chief Complaint: Palpitations  Context: Juanito Servin is a 42 y.o. male with h/o SVT for which pt takes Metoprolol XL 25 mg once a day. Pt presents to the ED c/o constant palpitations described as \"rapid\" onset at about 20:30 today. Pt reports that he called the paramedics for this earlier this evening and upon paramedics' arrival on scene, pt was noted to be in SVT due to which pt was administered 6 mg adenosine and then 12 mg adenosine. After administration of the 12 mg adenosine, pt converted out of SVT and has been in sinus tachycardia since. Pt reports that his palpitations have improved since initial onset. Pt denies chest pain/chest pressure/chest tightness, dyspnea, abdominal pain, nausea, vomiting, diaphoresis, and dizziness/lightheadedness. However, pt reports that for the past several days, he has had cough and congestion for which pt has been taking medications with possible decongestants in them. There are no other complaints at this time.       Location: Cardiac  Radiation: N/A  Character: Palpitations described as \"rapid\"  Duration: Onset at about 20:30 today  Severity: Moderate  Progression: Improving  Aggravating Factors: Nothing  Alleviating Factors: Adenosine          MEDICAL RECORD REVIEW    Pt was seen in the ER on 09/11/18 secondary to SVT.             PAST MEDICAL HISTORY  Active Ambulatory Problems     Diagnosis Date Noted   • No Active Ambulatory Problems     Resolved Ambulatory Problems     Diagnosis Date Noted   • No Resolved Ambulatory Problems     Past Medical History:   Diagnosis Date   • Hypertension    • SVT (supraventricular tachycardia) (CMS/HCC)    • WPW (Elinor-Parkinson-White syndrome)          PAST SURGICAL HISTORY  Past Surgical History:   Procedure Laterality Date   • FEMUR SURGERY           FAMILY HISTORY  History reviewed. No pertinent family " "history.      SOCIAL HISTORY  Social History     Social History   • Marital status: Single     Spouse name: N/A   • Number of children: N/A   • Years of education: N/A     Occupational History   • Not on file.     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        REVIEW OF SYSTEMS  Review of Systems   Constitutional: Negative for chills and diaphoresis.   HENT: Positive for congestion. Negative for rhinorrhea and sore throat.    Eyes: Negative for pain.   Respiratory: Positive for cough. Negative for shortness of breath.    Cardiovascular: Positive for palpitations (\"rapid\"). Negative for chest pain and leg swelling.   Gastrointestinal: Negative for abdominal pain, diarrhea, nausea and vomiting.   Genitourinary: Negative for difficulty urinating, dysuria, flank pain and frequency.   Musculoskeletal: Negative for myalgias, neck pain and neck stiffness.   Skin: Negative for rash.   Neurological: Negative for dizziness, speech difficulty, weakness, light-headedness, numbness and headaches.   Psychiatric/Behavioral: Negative.    All other systems reviewed and are negative.           PHYSICAL EXAM  ED Triage Vitals [10/12/18 2235]   Temp Heart Rate Resp BP SpO2   99.4 °F (37.4 °C) 120 18 (!) 156/104 96 %      Temp src Heart Rate Source Patient Position BP Location FiO2 (%)   Tympanic -- -- -- --       Physical Exam   Constitutional: He is oriented to person, place, and time. No distress.   HENT:   Head: Normocephalic.   Mouth/Throat: Mucous membranes are normal.   Eyes: Pupils are equal, round, and reactive to light. EOM are normal.   Neck: Normal range of motion. Neck supple.   Cardiovascular: " Regular rhythm and normal heart sounds.  Tachycardia present.    Pulmonary/Chest: Effort normal and breath sounds normal. No respiratory distress. He has no decreased breath sounds. He has no wheezes. He has no rhonchi. He has no rales.   Abdominal: Soft. There is no tenderness. There is no rebound and no guarding.   Musculoskeletal: Normal range of motion.   Neurological: He is alert and oriented to person, place, and time. He has normal sensation.   Skin: Skin is warm and dry.   Psychiatric: Mood and affect normal.   Nursing note and vitals reviewed.          LAB RESULTS  Recent Results (from the past 24 hour(s))   Comprehensive Metabolic Panel    Collection Time: 10/12/18 11:59 PM   Result Value Ref Range    Glucose 105 (H) 65 - 99 mg/dL    BUN 16 6 - 20 mg/dL    Creatinine 1.32 (H) 0.76 - 1.27 mg/dL    Sodium 143 136 - 145 mmol/L    Potassium 4.7 3.5 - 5.2 mmol/L    Chloride 105 98 - 107 mmol/L    CO2 29.7 (H) 22.0 - 29.0 mmol/L    Calcium 8.6 8.6 - 10.5 mg/dL    Total Protein 6.5 6.0 - 8.5 g/dL    Albumin 3.60 3.50 - 5.20 g/dL    ALT (SGPT) 21 1 - 41 U/L    AST (SGOT) 44 (H) 1 - 40 U/L    Alkaline Phosphatase 88 39 - 117 U/L    Total Bilirubin 0.2 0.1 - 1.2 mg/dL    eGFR Non African Amer 60 (L) >60 mL/min/1.73    Globulin 2.9 gm/dL    A/G Ratio 1.2 g/dL    BUN/Creatinine Ratio 12.1 7.0 - 25.0    Anion Gap 8.3 mmol/L   Troponin    Collection Time: 10/12/18 11:59 PM   Result Value Ref Range    Troponin T <0.010 0.000 - 0.030 ng/mL   CBC Auto Differential    Collection Time: 10/12/18 11:59 PM   Result Value Ref Range    WBC 12.11 (H) 4.50 - 10.70 10*3/mm3    RBC 4.36 (L) 4.60 - 6.00 10*6/mm3    Hemoglobin 14.1 13.7 - 17.6 g/dL    Hematocrit 41.2 40.4 - 52.2 %    MCV 94.5 79.8 - 96.2 fL    MCH 32.3 27.0 - 32.7 pg    MCHC 34.2 32.6 - 36.4 g/dL    RDW 12.4 11.5 - 14.5 %    RDW-SD 42.1 37.0 - 54.0 fl    MPV 10.7 6.0 - 12.0 fL    Platelets 245 140 - 500 10*3/mm3    Neutrophil % 76.6 (H) 42.7 - 76.0 %    Lymphocyte %  16.1 (L) 19.6 - 45.3 %    Monocyte % 4.3 (L) 5.0 - 12.0 %    Eosinophil % 2.4 0.3 - 6.2 %    Basophil % 0.6 0.0 - 1.5 %    Immature Grans % 1.7 (H) 0.0 - 0.5 %    Neutrophils, Absolute 9.28 (H) 1.90 - 8.10 10*3/mm3    Lymphocytes, Absolute 1.95 0.90 - 4.80 10*3/mm3    Monocytes, Absolute 0.52 0.20 - 1.20 10*3/mm3    Eosinophils, Absolute 0.29 0.00 - 0.70 10*3/mm3    Basophils, Absolute 0.07 0.00 - 0.20 10*3/mm3    Immature Grans, Absolute 0.20 (H) 0.00 - 0.03 10*3/mm3       Ordered the above labs and reviewed the results.        RADIOLOGY  XR Chest 2 View   Final Result   1. No active disease.       This report was finalized on 10/13/2018 12:24 AM by Abdi Whittington M.D.                 Ordered the above noted radiological studies. Reviewed by me in PACS.          PROCEDURES  Procedures      EKG:          Initial EKG interpretation time: 01:01 AM  EKG time: 23:47  Rhythm/Rate: Sinus tachycardia rate 113  P waves and IA: Normal P waves  QRS, axis: Normal QRS   ST and T waves: Normal ST     Interpreted Contemporaneously by me, independently viewed  changed compared to prior 09/11/18 (ectopy has resolved)        MEDICATIONS GIVEN IN ER  Medications   metoprolol tartrate (LOPRESSOR) injection 5 mg (5 mg Intravenous Given 10/13/18 0031)             PROGRESS AND CONSULTS  ED Course as of Oct 13 0247   Sat Oct 13, 2018   0052 12:53 AM  Patient with history of SVT.  Has not followed up with cardiologist.  Patient has been taking decongestants.  Will stop the decongestants.  Increase the metoprolol to 50mg a day and follow up with Dr. Zarco.  Patient states that his heart rate is always about 110.  [SL]      ED Course User Index  [SL] Be Mahmood MD       11:37 PM:  Pt is currently in sinus tachycardia rate 100s-110s -> Metoprolol ordered. Blood work, CXR, and EKG ordered for further evaluation.     12:51 AM:  Rechecked pt. Pt is resting comfortably and appears in no acute distress. Pt was informed that he has been  in sinus tachycardia in the ER. Pt was instructed to increase his dose of Metoprolol to 50 mg once a day and to stop taking all of his decongestants. Pt will be provided with f/u referral to the electrophysiologist. Strict RTER warnings given. Pt agrees with plan for discharge.             MEDICAL DECISION MAKING      MDM  Number of Diagnoses or Management Options  PSVT (paroxysmal supraventricular tachycardia) (CMS/[facility]):      Amount and/or Complexity of Data Reviewed  Clinical lab tests: ordered and reviewed (Troponin is negative.)  Tests in the radiology section of CPT®: reviewed and ordered (CXR:  No active disease.)  Tests in the medicine section of CPT®: ordered and reviewed (EKG interpreted.   )  Decide to obtain previous medical records or to obtain history from someone other than the patient: yes    Patient Progress  Patient progress: stable             DIAGNOSIS  Final diagnoses:   PSVT (paroxysmal supraventricular tachycardia) (CMS/[facility])         DISPOSITION  Pt discharged.      DISCHARGE    Patient discharged in stable condition.    Reviewed implications of results, diagnosis, meds, responsibility to follow up, warning signs and symptoms of possible worsening, potential complications and reasons to return to ER.    Patient/Family voiced understanding of above instructions.    Discussed plan for discharge, as there is no emergent indication for admission. Pt/family is agreeable and understands need for follow up and repeat testing. Pt is aware that discharge does not mean that nothing is wrong but it indicates no emergency is present that requires admission and they must continue care with follow-up as given below or physician of their choice.     FOLLOW-UP  [facility] CARDIOLOGY  [address]  [facility] [id number] [phone]  Schedule an appointment as soon as possible for a visit            Medication List      Changed    metoprolol succinate XL 25 MG 24  hr tablet  Commonly known as:  TOPROL-XL  Take 2 tablets by mouth Daily.  What changed:  how much to take        Stop    penicillin v potassium 500 MG tablet  Commonly known as:  VEETID              Latest Documented Vital Signs:  As of 2:39 AM  BP- (!) 144/117 HR- 112 Temp- 99.4 °F (37.4 °C) (Tympanic) O2 sat- 97%        --  Documentation assistance provided by pranav Pizano for Dr. Timoteo MD.  Information recorded by the scribe was done at my direction and has been verified and validated by me.               Rina Pizano  10/13/18 0245       Be Mahmood MD  10/13/18 0247

## 2018-10-15 ENCOUNTER — HOSPITAL ENCOUNTER (INPATIENT)
Facility: HOSPITAL | Age: 43
LOS: 2 days | Discharge: HOME | End: 2018-10-17
Attending: EMERGENCY MEDICINE | Admitting: INTERNAL MEDICINE

## 2018-10-15 ENCOUNTER — APPOINTMENT (OUTPATIENT)
Dept: GENERAL RADIOLOGY | Facility: HOSPITAL | Age: 43
End: 2018-10-15

## 2018-10-15 DIAGNOSIS — I47.1 SVT (SUPRAVENTRICULAR TACHYCARDIA) (HCC): Primary | ICD-10-CM

## 2018-10-15 LAB
ALBUMIN SERPL-MCNC: 4.6 G/DL (ref 3.5–5.2)
ALBUMIN/GLOB SERPL: 1.5 G/DL
ALP SERPL-CCNC: 92 U/L (ref 39–117)
ALT SERPL W P-5'-P-CCNC: 33 U/L (ref 1–41)
ANION GAP SERPL CALCULATED.3IONS-SCNC: 13.3 MMOL/L
AST SERPL-CCNC: 27 U/L (ref 1–40)
BASOPHILS # BLD AUTO: 0.06 10*3/MM3 (ref 0–0.2)
BASOPHILS NFR BLD AUTO: 0.5 % (ref 0–1.5)
BILIRUB SERPL-MCNC: 0.3 MG/DL (ref 0.1–1.2)
BUN BLD-MCNC: 15 MG/DL (ref 6–20)
BUN/CREAT SERPL: 11.9 (ref 7–25)
CALCIUM SPEC-SCNC: 9.9 MG/DL (ref 8.6–10.5)
CHLORIDE SERPL-SCNC: 102 MMOL/L (ref 98–107)
CO2 SERPL-SCNC: 26.7 MMOL/L (ref 22–29)
CREAT BLD-MCNC: 1.26 MG/DL (ref 0.76–1.27)
DEPRECATED RDW RBC AUTO: 41.4 FL (ref 37–54)
EOSINOPHIL # BLD AUTO: 0.18 10*3/MM3 (ref 0–0.7)
EOSINOPHIL NFR BLD AUTO: 1.6 % (ref 0.3–6.2)
ERYTHROCYTE [DISTWIDTH] IN BLOOD BY AUTOMATED COUNT: 12.3 % (ref 11.5–14.5)
GFR SERPL CREATININE-BSD FRML MDRD: 63 ML/MIN/1.73
GLOBULIN UR ELPH-MCNC: 3 GM/DL
GLUCOSE BLD-MCNC: 88 MG/DL (ref 65–99)
HCT VFR BLD AUTO: 47.2 % (ref 40.4–52.2)
HGB BLD-MCNC: 16.1 G/DL (ref 13.7–17.6)
HOLD SPECIMEN: NORMAL
HOLD SPECIMEN: NORMAL
IMM GRANULOCYTES # BLD: 0.16 10*3/MM3 (ref 0–0.03)
IMM GRANULOCYTES NFR BLD: 1.4 % (ref 0–0.5)
INR PPP: 0.96 (ref 0.9–1.1)
LYMPHOCYTES # BLD AUTO: 2.36 10*3/MM3 (ref 0.9–4.8)
LYMPHOCYTES NFR BLD AUTO: 20.8 % (ref 19.6–45.3)
MAGNESIUM SERPL-MCNC: 2.4 MG/DL (ref 1.6–2.6)
MCH RBC QN AUTO: 31.3 PG (ref 27–32.7)
MCHC RBC AUTO-ENTMCNC: 34.1 G/DL (ref 32.6–36.4)
MCV RBC AUTO: 91.7 FL (ref 79.8–96.2)
MONOCYTES # BLD AUTO: 0.87 10*3/MM3 (ref 0.2–1.2)
MONOCYTES NFR BLD AUTO: 7.7 % (ref 5–12)
NEUTROPHILS # BLD AUTO: 7.88 10*3/MM3 (ref 1.9–8.1)
NEUTROPHILS NFR BLD AUTO: 69.4 % (ref 42.7–76)
PLATELET # BLD AUTO: 292 10*3/MM3 (ref 140–500)
PMV BLD AUTO: 10.5 FL (ref 6–12)
POTASSIUM BLD-SCNC: 4.3 MMOL/L (ref 3.5–5.2)
PROT SERPL-MCNC: 7.6 G/DL (ref 6–8.5)
PROTHROMBIN TIME: 12.6 SECONDS (ref 11.7–14.2)
RBC # BLD AUTO: 5.15 10*6/MM3 (ref 4.6–6)
SODIUM BLD-SCNC: 142 MMOL/L (ref 136–145)
TROPONIN T SERPL-MCNC: <0.01 NG/ML (ref 0–0.03)
TSH SERPL DL<=0.05 MIU/L-ACNC: 15.41 MIU/ML (ref 0.27–4.2)
WBC NRBC COR # BLD: 11.35 10*3/MM3 (ref 4.5–10.7)
WHOLE BLOOD HOLD SPECIMEN: NORMAL
WHOLE BLOOD HOLD SPECIMEN: NORMAL

## 2018-10-15 PROCEDURE — 84484 ASSAY OF TROPONIN QUANT: CPT | Performed by: EMERGENCY MEDICINE

## 2018-10-15 PROCEDURE — 02K83ZZ MAP CONDUCTION MECHANISM, PERCUTANEOUS APPROACH: ICD-10-PCS | Performed by: INTERNAL MEDICINE

## 2018-10-15 PROCEDURE — 02583ZZ DESTRUCTION OF CONDUCTION MECHANISM, PERCUTANEOUS APPROACH: ICD-10-PCS | Performed by: INTERNAL MEDICINE

## 2018-10-15 PROCEDURE — 84443 ASSAY THYROID STIM HORMONE: CPT | Performed by: EMERGENCY MEDICINE

## 2018-10-15 PROCEDURE — 85025 COMPLETE CBC W/AUTO DIFF WBC: CPT | Performed by: EMERGENCY MEDICINE

## 2018-10-15 PROCEDURE — 93005 ELECTROCARDIOGRAM TRACING: CPT | Performed by: EMERGENCY MEDICINE

## 2018-10-15 PROCEDURE — 99223 1ST HOSP IP/OBS HIGH 75: CPT | Performed by: NURSE PRACTITIONER

## 2018-10-15 PROCEDURE — 93010 ELECTROCARDIOGRAM REPORT: CPT | Performed by: INTERNAL MEDICINE

## 2018-10-15 PROCEDURE — 71045 X-RAY EXAM CHEST 1 VIEW: CPT

## 2018-10-15 PROCEDURE — 80053 COMPREHEN METABOLIC PANEL: CPT | Performed by: EMERGENCY MEDICINE

## 2018-10-15 PROCEDURE — 83735 ASSAY OF MAGNESIUM: CPT | Performed by: EMERGENCY MEDICINE

## 2018-10-15 PROCEDURE — 99284 EMERGENCY DEPT VISIT MOD MDM: CPT

## 2018-10-15 PROCEDURE — 85610 PROTHROMBIN TIME: CPT | Performed by: EMERGENCY MEDICINE

## 2018-10-15 PROCEDURE — 25010000002 ADENOSINE PER 6 MG: Performed by: EMERGENCY MEDICINE

## 2018-10-15 RX ORDER — LISINOPRIL 40 MG/1
40 TABLET ORAL DAILY
COMMUNITY
End: 2021-11-25 | Stop reason: HOSPADM

## 2018-10-15 RX ORDER — LISINOPRIL 40 MG/1
40 TABLET ORAL DAILY
Status: DISCONTINUED | OUTPATIENT
Start: 2018-10-15 | End: 2018-10-17 | Stop reason: HOSPADM

## 2018-10-15 RX ORDER — SODIUM CHLORIDE 0.9 % (FLUSH) 0.9 %
10 SYRINGE (ML) INJECTION AS NEEDED
Status: DISCONTINUED | OUTPATIENT
Start: 2018-10-15 | End: 2018-10-17 | Stop reason: HOSPADM

## 2018-10-15 RX ORDER — SODIUM CHLORIDE 0.9 % (FLUSH) 0.9 %
3-10 SYRINGE (ML) INJECTION AS NEEDED
Status: DISCONTINUED | OUTPATIENT
Start: 2018-10-15 | End: 2018-10-17 | Stop reason: HOSPADM

## 2018-10-15 RX ORDER — NITROGLYCERIN 0.4 MG/1
0.4 TABLET SUBLINGUAL
Status: DISCONTINUED | OUTPATIENT
Start: 2018-10-15 | End: 2018-10-17 | Stop reason: HOSPADM

## 2018-10-15 RX ORDER — ACETAMINOPHEN 325 MG/1
650 TABLET ORAL EVERY 4 HOURS PRN
Status: DISCONTINUED | OUTPATIENT
Start: 2018-10-15 | End: 2018-10-17 | Stop reason: HOSPADM

## 2018-10-15 RX ORDER — SODIUM CHLORIDE 0.9 % (FLUSH) 0.9 %
3 SYRINGE (ML) INJECTION EVERY 12 HOURS SCHEDULED
Status: DISCONTINUED | OUTPATIENT
Start: 2018-10-15 | End: 2018-10-17 | Stop reason: HOSPADM

## 2018-10-15 RX ORDER — ADENOSINE 3 MG/ML
6 INJECTION, SOLUTION INTRAVENOUS ONCE
Status: COMPLETED | OUTPATIENT
Start: 2018-10-15 | End: 2018-10-15

## 2018-10-15 RX ADMIN — Medication 3 ML: at 21:00

## 2018-10-15 RX ADMIN — METOPROLOL TARTRATE 5 MG: 1 INJECTION, SOLUTION INTRAVENOUS at 14:38

## 2018-10-15 RX ADMIN — LISINOPRIL 40 MG: 40 TABLET ORAL at 18:31

## 2018-10-15 RX ADMIN — SODIUM CHLORIDE 1000 ML: 9 INJECTION, SOLUTION INTRAVENOUS at 13:47

## 2018-10-15 RX ADMIN — ADENOSINE 6 MG: 3 INJECTION, SOLUTION INTRAVENOUS at 13:46

## 2018-10-15 NOTE — H&P
"            Electrophysiology History & Physical    Date of Hospital Visit: 10/15/2018  1:09 PM  Encounter Provider: JOSE Rico  Place of Service: [facility] CARDIOLOGY  Patient Name: Juanito Servin  :1975  Referral Provider: Jayden Thomas MD      Chief complaint: Heart racing     History of Present Illness:    42 year old male with history of HTN and longstanding history of palpitations and heart racing episodes. He presented to ED for the 4th time in the last month with prolonged tachycardia that he could not break with vagal maneuvers. He has had these for over 10 years and could usually break them but has not been able to break the most recent ones. He says he usually lets it go on for a couple of hours before coming to ED. He presented in SVT, 's and was given adenosine and metoprolol and converted to SR. Since this is his 4th time in a month, admitted him for possible ablation.     He was seen in [place] for SVT and was told they thought he had WPW but he never followed up. He was \"off the rails\" for awhile and doing drugs but has been clean now for about 4 months. Episodes start abruptly and use to stop abruptly with vagal maneuvers but not recently, increasing in frequency and duration.      He had no chest pain, shortness of breath, PND, orthopnea or edema.     He was given metoprolol recently on on of his ED visits but has not been taking that.     Past Medical History:   Diagnosis Date   • Hypertension    • SVT (supraventricular tachycardia) (CMS/[facility])    • WPW (Elinor-Parkinson-White syndrome)          Past Surgical History:   Procedure Laterality Date   • FEMUR SURGERY         History reviewed. No pertinent family history.    Prescriptions Prior to Admission   Medication Sig Dispense Refill Last Dose   • lisinopril (PRINIVIL,ZESTRIL) 40 MG tablet Take 40 mg by mouth Daily.      • metoprolol succinate XL (TOPROL-XL) 25 MG 24 hr tablet Take 2 tablets " "by mouth Daily. 30 tablet 0        Current Meds  No current facility-administered medications on file prior to encounter.      Current Outpatient Prescriptions on File Prior to Encounter   Medication Sig Dispense Refill   • metoprolol succinate XL (TOPROL-XL) 25 MG 24 hr tablet Take 2 tablets by mouth Daily. 30 tablet 0         Social History     Social History   • Marital status: Single     Spouse name: N/A   • Number of children: N/A   • Years of education: N/A     Occupational History   • Not on file.     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 Pertinent positives identified in HPI. All other systems are negative.     12 point ROS was performed and is negative except as outlined in HPI            Objective:     Vitals:    10/15/18 1521 10/15/18 1522 10/15/18 1551 10/15/18 1552   BP: (!) 147/117 (!) 139/103 139/99 139/99   BP Location: Right arm Right arm Right arm Right arm   Patient Position: Lying Lying Lying Lying   Pulse: 94  83 94   Resp: 18      Temp:       TempSrc:       SpO2: 95%      Weight: 102 kg (225 lb 3.2 oz)      Height: 182.9 cm (72\")        Body mass index is 30.54 kg/m².  Flowsheet Rows      First Filed Value   Admission Height  182.9 cm (72\") Documented at 10/15/2018 1308   Admission Weight  102 kg (224 lb 11.2 oz) Documented at 10/15/2018 1324          PHYSICAL EXAM:    Vitals Reviewed.   General Appearance: No acute distress, well developed and well nourished.   Eyes: Conjunctiva and lids: No erythema, swelling, or discharge. Sclera non-icteric.   HENT: Atraumatic, normocephalic. External eyes, ears, and nose normal.   Respiratory: " No signs of respiratory distress. Respiration rhythm and depth normal.   Clear to auscultation. No rales, crackles, rhonchi, or wheezing auscultated.   Cardiovascular:  Jugular Venous Pressure: Normal  Heart Rate and Rhythm: Normal, Heart Sounds: Normal S1 and S2. No S3 or S4 noted.  Murmurs: No murmurs noted. No rubs, thrills, or gallops.   Arterial Pulses: Posterior tibialis and dorsalis pedis pulses normal.   Lower Extremities: No edema noted.  Gastrointestinal:  Abdomen soft, non-distended, non-tender. Normal bowel sounds.   Musculoskeletal: Normal movement of extremities  Skin: Warm and dry.   Psychiatric: Patient alert and oriented to person, place, and time. Speech and behavior appropriate. Normal mood and affect.                                                                       Lab Review:    Results from last 7 days  Lab Units 10/15/18  1320 10/12/18  2359   SODIUM mmol/L 142 143   POTASSIUM mmol/L 4.3 4.7   CHLORIDE mmol/L 102 105   CO2 mmol/L 26.7 29.7*   BUN mg/dL 15 16   CREATININE mg/dL 1.26 1.32*   GLUCOSE mg/dL 88 105*   CALCIUM mg/dL 9.9 8.6   AST (SGOT) U/L 27 44*   ALT (SGPT) U/L 33 21       Results from last 7 days  Lab Units 10/15/18  1320 10/12/18  2359   TROPONIN T ng/mL <0.010 <0.010       Results from last 7 days  Lab Units 10/15/18  1320   WBC 10*3/mm3 11.35*   HEMOGLOBIN g/dL 16.1   HEMATOCRIT % 47.2   PLATELETS 10*3/mm3 292       Results from last 7 days  Lab Units 10/15/18  1320   INR  0.96           Results from last 7 days  Lab Units 10/15/18  1320   MAGNESIUM mg/dL 2.4                   Results from last 7 days  Lab Units 10/15/18  1320   TSH mIU/mL 15.410*         Imaging:     Study Result     AP CHEST 10/15/2018      HISTORY: Dysrhythmia.     FINDINGS: The heart size is within normal limits. Lungs appear free of  acute infiltrates. Bones and soft tissues are unremarkable.     IMPRESSION:  No acute process.         EKG:               I personally viewed and interpreted the  patient's EKG/Telemetry data    Assessment:      SVT (supraventricular tachycardia) (CMS/[facility])       Plan:       Dr. Thomas and I saw Mr. Servin. It does look like he has a left lateral concealed pathway/SVT. Discussed treatment options and recommend ablation since he has had increase in frequency and duration. Discussed risks and benefits. He is agreeable, will schedule for tomorrow.       JOSE Rico  [facility]  10/15/18  4:09 PM

## 2018-10-15 NOTE — ED PROVIDER NOTES
" EMERGENCY DEPARTMENT ENCOUNTER    CHIEF COMPLAINT  Chief Complaint: Palpitations  History given by: Pt  History limited by: Nothing  Room Number: 29/29  PMD: Provider, No Known  Cardiology: Dr. Zarco    HPI:  Pt is a 42 y.o. male who presents complaining of SOA and \"racing\" palpitations for the last two hours PTA. Pt reports he has been attempting valsalva maneuvers all morning without relief. He reports he has had SVT numerous times in the past and has been seen twice for SVT on 10/12 and 10/13. He reports that he has had successful conversions with adenosine in the past.  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  Pt denies CP, abd pain, fever, vomiting.  States he has not filled his metoprolol yet.    Duration:  Two hours  Onset: sudden  Timing: constant  Quality: \"racing\"  Intensity/Severity: moderate to severe  Progression: unchanged  Associated Symptoms: SOA  Aggravating Factors: none  Alleviating Factors: none  Previous Episodes: Pt reports a hx of SVT and WPW  Treatment before arrival: Pt reports he has been attempting valsalva maneuvers at home PTA without relief    PAST MEDICAL HISTORY  Active Ambulatory Problems     Diagnosis Date Noted   • No Active Ambulatory Problems     Resolved Ambulatory Problems     Diagnosis Date Noted   • No Resolved Ambulatory Problems     Past Medical History:   Diagnosis Date   • Hypertension    • SVT (supraventricular tachycardia) (CMS/HCC)    • WPW (Elinor-Parkinson-White syndrome)        PAST SURGICAL HISTORY  Past Surgical History:   Procedure Laterality Date   • FEMUR SURGERY         FAMILY HISTORY  History reviewed. No pertinent family history.    SOCIAL HISTORY  Social History     Social History   • Marital status: Single     Spouse name: N/A   • Number of children: N/A   • Years of education: N/A     Occupational History   • Not on file.     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    REVIEW OF SYSTEMS  Review of Systems   Constitutional: Negative for activity change, appetite change and fever.   HENT: Negative for congestion and sore throat.    Eyes: Negative.    Respiratory: Positive for shortness of breath. Negative for cough.    Cardiovascular: Positive for palpitations. Negative for chest pain and leg swelling.   Gastrointestinal: Negative for abdominal pain, diarrhea and vomiting.   Endocrine: Negative.    Genitourinary: Negative for decreased urine volume and dysuria.   Musculoskeletal: Negative for neck pain.   Skin: Negative for rash and wound.   Allergic/Immunologic: Negative.    Neurological: Negative for weakness, numbness and headaches.   Hematological: Negative.    Psychiatric/Behavioral: Negative.    All other systems reviewed and are negative.      PHYSICAL EXAM  ED Triage Vitals [10/15/18 1308]   Temp Pulse Resp BP SpO2   98.8 °F (37.1 °C) -- 18 -- --      Temp src Heart Rate Source Patient Position BP Location FiO2 (%)   Tympanic -- -- -- --       Physical Exam   Constitutional: He is oriented to person, place, and time. He appears distressed.   HENT:   Head: Normocephalic and atraumatic.   Eyes: EOM are normal.   Neck: Normal range of motion.   Cardiovascular: Regular rhythm and normal heart sounds.  Tachycardia present.    No murmur heard.  Pulses:       Radial pulses are 2+ on the right side, and 2+ on the left side.        Posterior tibial pulses are 2+ on the right side, and 2+ on the left side.   Pulmonary/Chest: Effort normal and breath sounds normal. No respiratory distress. He has no wheezes.    Abdominal: Soft. Bowel sounds are normal. There is no tenderness. There is no rebound and no guarding.   Musculoskeletal: Normal range of motion. He exhibits no edema.   Neurological: He is alert and oriented to person, place, and time.   Skin: Skin is warm and dry.   Psychiatric: Affect normal.   Nursing note and vitals reviewed.      LAB RESULTS  Lab Results (last 24 hours)     Procedure Component Value Units Date/Time    Troponin [[id number]]  (Normal) Collected:  10/15/18 1320    Specimen:  Blood Updated:  10/15/18 1405     Troponin T <0.010 ng/mL     Narrative:       Troponin T Reference Ranges:  Less than 0.03 ng/mL:    Negative for AMI  0.03 to 0.09 ng/mL:      Indeterminant for AMI  Greater than 0.09 ng/mL: Positive for AMI    CBC & Differential [[id number]] Collected:  10/15/18 1320    Specimen:  Blood Updated:  10/15/18 1339    Narrative:       The following orders were created for panel order CBC & Differential.  Procedure                               Abnormality         Status                     ---------                               -----------         ------                     CBC Auto Differential[[id number]]        Abnormal            Final result                 Please view results for these tests on the individual orders.    Comprehensive Metabolic Panel [[id number]] Collected:  10/15/18 1320    Specimen:  Blood Updated:  10/15/18 1358     Glucose 88 mg/dL      BUN 15 mg/dL      Creatinine 1.26 mg/dL      Sodium 142 mmol/L      Potassium 4.3 mmol/L      Chloride 102 mmol/L      CO2 26.7 mmol/L      Calcium 9.9 mg/dL      Total Protein 7.6 g/dL      Albumin 4.60 g/dL      ALT (SGPT) 33 U/L      AST (SGOT) 27 U/L      Alkaline Phosphatase 92 U/L      Total Bilirubin 0.3 mg/dL      eGFR Non African Amer 63 mL/min/1.73      Globulin 3.0 gm/dL      A/G Ratio 1.5 g/dL      BUN/Creatinine Ratio 11.9     Anion Gap 13.3 mmol/L     Magnesium [[id number]]  (Normal) Collected:  10/15/18 1320    Specimen:   Blood Updated:  10/15/18 1358     Magnesium 2.4 mg/dL     Protime-INR [[id number]]  (Normal) Collected:  10/15/18 1320    Specimen:  Blood Updated:  10/15/18 1345     Protime 12.6 Seconds      INR 0.96    TSH [[id number]]  (Abnormal) Collected:  10/15/18 1320    Specimen:  Blood Updated:  10/15/18 1405     TSH 15.410 (H) mIU/mL     CBC Auto Differential [[id number]]  (Abnormal) Collected:  10/15/18 1320    Specimen:  Blood Updated:  10/15/18 1339     WBC 11.35 (H) 10*3/mm3      RBC 5.15 10*6/mm3      Hemoglobin 16.1 g/dL      Hematocrit 47.2 %      MCV 91.7 fL      MCH 31.3 pg      MCHC 34.1 g/dL      RDW 12.3 %      RDW-SD 41.4 fl      MPV 10.5 fL      Platelets 292 10*3/mm3      Neutrophil % 69.4 %      Lymphocyte % 20.8 %      Monocyte % 7.7 %      Eosinophil % 1.6 %      Basophil % 0.5 %      Immature Grans % 1.4 (H) %      Neutrophils, Absolute 7.88 10*3/mm3      Lymphocytes, Absolute 2.36 10*3/mm3      Monocytes, Absolute 0.87 10*3/mm3      Eosinophils, Absolute 0.18 10*3/mm3      Basophils, Absolute 0.06 10*3/mm3      Immature Grans, Absolute 0.16 (H) 10*3/mm3           I ordered the above labs and reviewed the results    RADIOLOGY  XR Chest 1 View   Preliminary Result   No acute process.               I ordered the above noted radiological studies. Interpreted by radiologist. Reviewed by me in PACS.       PROCEDURES  Critical Care  Performed by: ABUNDIO EID  Authorized by: ABUNDIO EID     Critical care provider statement:     Critical care time (minutes):  30    Critical care time was exclusive of:  Separately billable procedures and treating other patients    Critical care was necessary to treat or prevent imminent or life-threatening deterioration of the following conditions:  Cardiac failure    Critical care was time spent personally by me on the following activities:  Ordering and performing treatments and interventions, ordering and review of laboratory studies, ordering and review of radiographic  studies, pulse oximetry, re-evaluation of patient's condition, review of old charts, obtaining history from patient or surrogate, examination of patient, evaluation of patient's response to treatment, discussions with consultants and development of treatment plan with patient or surrogate          EKG          EKG time: 1312  Rhythm/Rate: SVT, 200s  P waves and NH: not well appreciated  QRS, axis: narrow complex   ST and T waves: nonspecific ST and T wave findings diffusely     Interpreted Contemporaneously by me, independently viewed  unchanged compared to prior 10/13/18    EKG          EKG time: 1346  Rhythm/Rate: sinus tachycardia, 110s  P waves and NH: normal  QRS, axis: normal   ST and T waves: normal     Interpreted Contemporaneously by me, independently viewed  changed compared to prior today at 1312      PROGRESS AND CONSULTS        1315 - CXR, lab work ordered for further evaluation.     1321 - multiple attempts at modified valsalva maneuver without successful conversion    1340 - Discussed pt care with Dr. Thomas (Cardiology) who reports that the pt is suitable for adenosine. He states that the pt can follow up in office.     1344 - Rechecked pt. Pt is in NAD. Administered 6 mg of adenosine. HR went from 200s to 120s. Pt reports his resting rate is in the 120s.     1347 - IVF ordered.     1348 - Repeat EKG ordered after the pt converted.     1350 - Lopressor ordered.     1401 - Discussed pt care with Lucia Benson (Mid level for Dr. Thomas) she states that Dr. Thomas wants to send the pt to [facility] today for ablation tomorrow morning. Informed pt of this discussion and he agrees with the plan for admission. Pt understands and agrees with plan. All questions answered.     MEDICAL DECISION MAKING  Results were reviewed/discussed with the patient and they were also made aware of online access. Pt also made aware that some labs, such as cultures, will not be resulted during ER visit and follow up with PMD is  necessary.     MDM  Number of Diagnoses or Management Options     Amount and/or Complexity of Data Reviewed  Clinical lab tests: ordered and reviewed (Troponin - negative  TSH - 15.410)  Tests in the radiology section of CPT®: ordered and reviewed (CXR - no acute process)  Tests in the medicine section of CPT®: reviewed and ordered (See EKG procedure note.)  Decide to obtain previous medical records or to obtain history from someone other than the patient: yes  Review and summarize past medical records: yes (Pt has been seen in the ER on 10/12 and 10/13 for SVT. He received adenosine on 10/13 and was converted. )  Discuss the patient with other providers: yes (Dr. Thomas (Cardiology))    Critical Care  Total time providing critical care: 30-74 minutes         DIAGNOSIS  Final diagnoses:   SVT (supraventricular tachycardia) (CMS/HCC)       DISPOSITION  ADMISSION    Discussed treatment plan and reason for admission with pt/family and admitting physician.  Pt/family voiced understanding of the plan for admission for further testing/treatment as needed.       Latest Documented Vital Signs:  As of 2:37 PM  BP- 139/84 HR- 114 Temp- 98.8 °F (37.1 °C) (Tympanic) O2 sat- 100%    --  Documentation assistance provided by pranav Miramontes for Dr. Campos.  Information recorded by the scribe was done at my direction and has been verified and validated by me.     Kwesi Miramontes  10/15/18 2471       Cassy Campos MD  10/18/18 0032

## 2018-10-16 PROCEDURE — C1894 INTRO/SHEATH, NON-LASER: HCPCS | Performed by: INTERNAL MEDICINE

## 2018-10-16 PROCEDURE — 85347 COAGULATION TIME ACTIVATED: CPT

## 2018-10-16 PROCEDURE — C1766 INTRO/SHEATH,STRBLE,NON-PEEL: HCPCS | Performed by: INTERNAL MEDICINE

## 2018-10-16 PROCEDURE — 93613 INTRACARDIAC EPHYS 3D MAPG: CPT | Performed by: INTERNAL MEDICINE

## 2018-10-16 PROCEDURE — 93653 COMPRE EP EVAL TX SVT: CPT | Performed by: INTERNAL MEDICINE

## 2018-10-16 PROCEDURE — 25010000002 HEPARIN (PORCINE) PER 1000 UNITS: Performed by: INTERNAL MEDICINE

## 2018-10-16 PROCEDURE — 25010000002 MIDAZOLAM PER 1 MG: Performed by: INTERNAL MEDICINE

## 2018-10-16 PROCEDURE — 99153 MOD SED SAME PHYS/QHP EA: CPT | Performed by: INTERNAL MEDICINE

## 2018-10-16 PROCEDURE — C1732 CATH, EP, DIAG/ABL, 3D/VECT: HCPCS | Performed by: INTERNAL MEDICINE

## 2018-10-16 PROCEDURE — 93622 COMP EP EVAL L VENTR PAC&REC: CPT | Performed by: INTERNAL MEDICINE

## 2018-10-16 PROCEDURE — 25010000002 FENTANYL CITRATE (PF) 100 MCG/2ML SOLUTION: Performed by: INTERNAL MEDICINE

## 2018-10-16 PROCEDURE — C1893 INTRO/SHEATH, FIXED,NON-PEEL: HCPCS | Performed by: INTERNAL MEDICINE

## 2018-10-16 PROCEDURE — 25010000002 PROTAMINE SULFATE PER 10 MG: Performed by: INTERNAL MEDICINE

## 2018-10-16 PROCEDURE — C1730 CATH, EP, 19 OR FEW ELECT: HCPCS | Performed by: INTERNAL MEDICINE

## 2018-10-16 PROCEDURE — 93462 L HRT CATH TRNSPTL PUNCTURE: CPT | Performed by: INTERNAL MEDICINE

## 2018-10-16 PROCEDURE — 93621 COMP EP EVL L PAC&REC C SINS: CPT | Performed by: INTERNAL MEDICINE

## 2018-10-16 PROCEDURE — 99152 MOD SED SAME PHYS/QHP 5/>YRS: CPT | Performed by: INTERNAL MEDICINE

## 2018-10-16 RX ORDER — MIDAZOLAM HYDROCHLORIDE 1 MG/ML
INJECTION INTRAMUSCULAR; INTRAVENOUS AS NEEDED
Status: DISCONTINUED | OUTPATIENT
Start: 2018-10-16 | End: 2018-10-16 | Stop reason: HOSPADM

## 2018-10-16 RX ORDER — ASPIRIN 81 MG/1
81 TABLET ORAL DAILY
Status: DISCONTINUED | OUTPATIENT
Start: 2018-10-16 | End: 2018-10-17 | Stop reason: HOSPADM

## 2018-10-16 RX ORDER — FENTANYL CITRATE 50 UG/ML
INJECTION, SOLUTION INTRAMUSCULAR; INTRAVENOUS AS NEEDED
Status: DISCONTINUED | OUTPATIENT
Start: 2018-10-16 | End: 2018-10-16 | Stop reason: HOSPADM

## 2018-10-16 RX ORDER — PROTAMINE SULFATE 10 MG/ML
INJECTION, SOLUTION INTRAVENOUS AS NEEDED
Status: DISCONTINUED | OUTPATIENT
Start: 2018-10-16 | End: 2018-10-16 | Stop reason: HOSPADM

## 2018-10-16 RX ORDER — HEPARIN SODIUM 1000 [USP'U]/ML
INJECTION, SOLUTION INTRAVENOUS; SUBCUTANEOUS AS NEEDED
Status: DISCONTINUED | OUTPATIENT
Start: 2018-10-16 | End: 2018-10-16 | Stop reason: HOSPADM

## 2018-10-16 RX ORDER — SODIUM CHLORIDE 9 MG/ML
INJECTION, SOLUTION INTRAVENOUS CONTINUOUS PRN
Status: COMPLETED | OUTPATIENT
Start: 2018-10-16 | End: 2018-10-16

## 2018-10-16 RX ORDER — LIDOCAINE HYDROCHLORIDE AND EPINEPHRINE 10; 10 MG/ML; UG/ML
INJECTION, SOLUTION INFILTRATION; PERINEURAL AS NEEDED
Status: DISCONTINUED | OUTPATIENT
Start: 2018-10-16 | End: 2018-10-16 | Stop reason: HOSPADM

## 2018-10-16 RX ADMIN — Medication 3 ML: at 20:28

## 2018-10-16 RX ADMIN — LISINOPRIL 40 MG: 40 TABLET ORAL at 08:59

## 2018-10-16 RX ADMIN — ASPIRIN 81 MG: 81 TABLET, DELAYED RELEASE ORAL at 20:25

## 2018-10-16 RX ADMIN — Medication 3 ML: at 09:00

## 2018-10-16 NOTE — PLAN OF CARE
Problem: Patient Care Overview  Goal: Plan of Care Review  Outcome: Ongoing (interventions implemented as appropriate)   10/16/18 5483   Coping/Psychosocial   Plan of Care Reviewed With patient   Plan of Care Review   Progress improving   OTHER   Outcome Summary NPO @midnight for EP Study/ Ablation. Pt was very hungry, demanding food frequently prior to being NPO. Room Air. Vital Signs Stable. Will Continue to Monitor.        Problem: Arrhythmia/Dysrhythmia (Symptomatic) (Adult)  Goal: Signs and Symptoms of Listed Potential Problems Will be Absent, Minimized or Managed (Arrhythmia/Dysrhythmia)  Outcome: Ongoing (interventions implemented as appropriate)

## 2018-10-16 NOTE — PLAN OF CARE
Problem: Patient Care Overview  Goal: Plan of Care Review  Outcome: Ongoing (interventions implemented as appropriate)   10/16/18 7974   Coping/Psychosocial   Plan of Care Reviewed With patient   Plan of Care Review   Progress improving   OTHER   Outcome Summary S/P SVT ablation. Bilateral groin sites C/D/I and soft, stopcocks are in place. VSS. Will continue to monitor.        Problem: Arrhythmia/Dysrhythmia (Symptomatic) (Adult)  Goal: Signs and Symptoms of Listed Potential Problems Will be Absent, Minimized or Managed (Arrhythmia/Dysrhythmia)  Outcome: Ongoing (interventions implemented as appropriate)

## 2018-10-17 VITALS
TEMPERATURE: 98 F | BODY MASS INDEX: 30.5 KG/M2 | SYSTOLIC BLOOD PRESSURE: 139 MMHG | DIASTOLIC BLOOD PRESSURE: 94 MMHG | HEART RATE: 109 BPM | WEIGHT: 225.2 LBS | RESPIRATION RATE: 18 BRPM | HEIGHT: 72 IN | OXYGEN SATURATION: 96 %

## 2018-10-17 LAB
ACT BLD: 263 SECONDS (ref 82–152)
ACT BLD: 268 SECONDS (ref 82–152)
ACT BLD: 279 SECONDS (ref 82–152)
T-UPTAKE NFR SERPL: 1.14 TBI (ref 0.8–1.3)
T3FREE SERPL-MCNC: 2.74 PG/ML (ref 2–4.4)
T4 FREE SERPL-MCNC: 0.68 NG/DL (ref 0.93–1.7)
T4 SERPL-MCNC: 4.46 MCG/DL (ref 4.5–11.7)
TSH SERPL DL<=0.05 MIU/L-ACNC: 8.18 MIU/ML (ref 0.27–4.2)

## 2018-10-17 PROCEDURE — 84443 ASSAY THYROID STIM HORMONE: CPT | Performed by: NURSE PRACTITIONER

## 2018-10-17 PROCEDURE — 84479 ASSAY OF THYROID (T3 OR T4): CPT | Performed by: NURSE PRACTITIONER

## 2018-10-17 PROCEDURE — 84481 FREE ASSAY (FT-3): CPT | Performed by: NURSE PRACTITIONER

## 2018-10-17 PROCEDURE — 84439 ASSAY OF FREE THYROXINE: CPT | Performed by: NURSE PRACTITIONER

## 2018-10-17 PROCEDURE — 99238 HOSP IP/OBS DSCHRG MGMT 30/<: CPT | Performed by: NURSE PRACTITIONER

## 2018-10-17 RX ORDER — ASPIRIN 81 MG/1
81 TABLET ORAL DAILY
Qty: 30 TABLET | Refills: 0
Start: 2018-10-18

## 2018-10-17 RX ADMIN — LISINOPRIL 40 MG: 40 TABLET ORAL at 08:24

## 2018-10-17 RX ADMIN — Medication 3 ML: at 08:24

## 2018-10-17 RX ADMIN — ASPIRIN 81 MG: 81 TABLET, DELAYED RELEASE ORAL at 08:24

## 2018-10-17 NOTE — PAYOR COMM NOTE
"    Juanito Bach  (42 y.o. Male)               ATTENTION;   PRECERT ,PLEASE LOAD INPATIENT CASE ONCE IN BOTH OF YOUR SYSTEMS,            SPOKE WITH YOUNG ON 10/16/18 AND PATIENT ONLY IN STATE SYSTEM.            REPLY TO [facility] N  OR UR  [phone]            DIAGNOSIS CODE I47.1  (SVT)                     Date of Birth Social Security Number Address Home Phone MRN    1975  [id number]   [facility] [address] [phone] [id number]    Gnosticism Marital Status          None Single       Admission Date Admission Type Admitting Provider Attending Provider Department, Room/Bed    10/15/18 Emergency Jayden Thomas MD Mandrola, John, MD [address] [facility], 2210/1    Discharge Date Discharge Disposition Discharge Destination                       Attending Provider:  Jayden Thomas MD    Allergies:  No Known Allergies    Isolation:  None   Infection:  None   Code Status:  CPR    Ht:  182.9 cm (72\")   Wt:  102 kg (225 lb 3.2 oz)    Admission Cmt:  None   Principal Problem:  None                Active Insurance as of 10/15/2018     Primary Coverage     Payor Plan Insurance Group Employer/Plan Group    PASSPORT PASSPORT MEDICAID     Payor Plan Address Payor Plan Phone Number Effective From Effective To    [address] [phone] 11/1/1997     [facility] [id number]       Subscriber Name Subscriber Birth Date Member ID       JUANITO BACH 1975 [id number]                 Emergency Contacts      (Rel.) Home Phone Work Phone Mobile Phone    Yasmin Bach (Mother) [phone] -- [phone]        LOC:Acute Adult-Arrhythmia by Claudia Bradford RN        In Progress (Intermediate Met):  Reviewed on 10/15/2018 by Claudia Bradford RN        Created Using Review Status Review Entered   Mob[employer]® In Primary 10/15/2018 14:10      Criteria Set Name - Subset   LOC:Acute Adult-Arrhythmia      Criteria Review   REVIEW SUMMARY     Patient: " "JEANINE BACH  Review Number: [id number]  Review Status: In Primary     Condition Specific: Yes        OUTCOMES  Outcome Type: Primary           REVIEW DETAILS     Product: LOC:Acute Adult  Subset: Arrhythmia      (Symptom or finding within 24h)         (Excludes PO medications unless noted)          [X] Select Day, One:              [X] Episode Day 1, One:                  [X] INTERMEDIATE, One:                      [X] Atrial or ventricular arrhythmia, Both:                          [X] Arrhythmia, One:                              [X] Ventricular arrhythmia, >= One:                                  [X] Superventricular tachycardia                          [X] Continuous cardiac monitoring (excludes Holter), Both:                              [X] Continuous cardiac monitoring (excludes Holter)                              [X] Intervention, >= One:                                  [X] IV antiarrhythmic and hemodynamically stable     Version: Balzo 2018.1  [employer]  © 2018 [employer] and/or one of its subsidiaries.  All Rights Reserved.  CPT only © 2017 American Medical Association.  All Rights Reserved.            ED Provider Notes    Date of Service: 10/15/2018 1:13 PM  Kwesi Miramontes   Procedure Orders:   1. Critical Care [[id number]] ordered by Cassy Campos MD at 10/15/18 1403   Expand All Collapse All    []Manual[]Template  []Copied   EMERGENCY DEPARTMENT ENCOUNTER     CHIEF COMPLAINT  Chief Complaint: Palpitations  History given by: Pt  History limited by: Nothing  Room Number: 29/29  PMD: Provider, No Known  Cardiology: Dr. Zarco     HPI:  Pt is a 42 y.o. male who presents complaining of SOA and \"racing\" palpitations for the last two hours PTA. Pt reports he has been attempting valsalva maneuvers all morning without relief. He reports he has had SVT numerous times in the past and has been seen twice for SVT on 10/12 and 10/13. He reports that he has had " "successful conversions with adenosine in the past.  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      Duration:  Two hours  Onset: sudden  Timing: constant  Quality: \"racing\"  Intensity/Severity: moderate to severe  Progression: unchanged  Associated Symptoms: SOA  Aggravating Factors: none  Alleviating Factors: none  Previous Episodes: Pt reports a hx of SVT and WPW  Treatment before arrival: Pt reports he has been attempting valsalva maneuvers at home PTA without relief     PAST MEDICAL HISTORY       Active Ambulatory Problems     Diagnosis Date Noted   • No Active Ambulatory Problems           Resolved Ambulatory Problems     Diagnosis Date Noted   • No Resolved Ambulatory Problems           Past Medical History:   Diagnosis Date   • Hypertension     • SVT (supraventricular tachycardia) (CMS/HCC)     • WPW (Elinor-Parkinson-White syndrome)           PAST SURGICAL HISTORY  Surgical History   Past Surgical History:   Procedure Laterality Date   • FEMUR SURGERY                FAMILY HISTORY  History reviewed. No pertinent family history.     SOCIAL HISTORY  Social History   Social History            Social History   • Marital status: Single       Spouse name: N/A   • Number of children: N/A   • Years of education: N/A          Occupational History   • Not on file.             Social History Main Topics   • Smoking status: Former Smoker       Types: Cigarettes       Quit date: 2010   • Smokeless tobacco: Current User       Types: Chew   • Alcohol use No   • Drug use: No         Comment: Past use of meth -- 4 mos clean   • Sexual activity: Defer           Other Topics Concern   • Not on file          Social History Narrative   • No narrative on file            ALLERGIES  Patient has no known allergies.     REVIEW OF SYSTEMS  Review of Systems   Constitutional: Negative for activity change, appetite change and fever. "   HENT: Negative for congestion and sore throat.    Eyes: Negative.    Respiratory: Positive for shortness of breath. Negative for cough.    Cardiovascular: Positive for palpitations. Negative for chest pain and leg swelling.   Gastrointestinal: Negative for abdominal pain, diarrhea and vomiting.   Endocrine: Negative.    Genitourinary: Negative for decreased urine volume and dysuria.   Musculoskeletal: Negative for neck pain.   Skin: Negative for rash and wound.   Allergic/Immunologic: Negative.    Neurological: Negative for weakness, numbness and headaches.   Hematological: Negative.    Psychiatric/Behavioral: Negative.    All other systems reviewed and are negative.        PHYSICAL EXAM  ED Triage Vitals [10/15/18 1308]   Temp Pulse Resp BP SpO2   98.8 °F (37.1 °C) -- 18 -- --       Temp src Heart Rate Source Patient Position BP Location FiO2 (%)   Tympanic -- -- -- --         Physical Exam   Constitutional: He is oriented to person, place, and time. He appears distressed.   HENT:   Head: Normocephalic and atraumatic.   Eyes: EOM are normal.   Neck: Normal range of motion.   Cardiovascular: Normal rate, regular rhythm and normal heart sounds.    No murmur heard.  Pulses:       Posterior tibial pulses are 2+ on the right side, and 2+ on the left side.   Pulmonary/Chest: Effort normal and breath sounds normal. No respiratory distress. He has no wheezes.   Abdominal: Soft. Bowel sounds are normal. There is no tenderness. There is no rebound and no guarding.   Musculoskeletal: Normal range of motion. He exhibits no edema.   Neurological: He is alert and oriented to person, place, and time.   Skin: Skin is warm and dry.   Psychiatric: Affect normal.   Nursing note and vitals reviewed.        LAB RESULTS  Lab Results (last 24 hours)      Procedure Component Value Units Date/Time     Troponin [[id number]]  (Normal) Collected:  10/15/18 1320     Specimen:  Blood Updated:  10/15/18 1405       Troponin T <0.010 ng/mL        Narrative:        Troponin T Reference Ranges:  Less than 0.03 ng/mL:    Negative for AMI  0.03 to 0.09 ng/mL:      Indeterminant for AMI  Greater than 0.09 ng/mL: Positive for AMI     CBC & Differential [[id number]] Collected:  10/15/18 1320     Specimen:  Blood Updated:  10/15/18 1339     Narrative:        The following orders were created for panel order CBC & Differential.  Procedure                               Abnormality         Status                     ---------                               -----------         ------                     CBC Auto Differential[[id number]]        Abnormal            Final result                  Please view results for these tests on the individual orders.     Comprehensive Metabolic Panel [[id number]] Collected:  10/15/18 1320     Specimen:  Blood Updated:  10/15/18 1358       Glucose 88 mg/dL         BUN 15 mg/dL         Creatinine 1.26 mg/dL         Sodium 142 mmol/L         Potassium 4.3 mmol/L         Chloride 102 mmol/L         CO2 26.7 mmol/L         Calcium 9.9 mg/dL         Total Protein 7.6 g/dL         Albumin 4.60 g/dL         ALT (SGPT) 33 U/L         AST (SGOT) 27 U/L         Alkaline Phosphatase 92 U/L         Total Bilirubin 0.3 mg/dL         eGFR Non African Amer 63 mL/min/1.73         Globulin 3.0 gm/dL         A/G Ratio 1.5 g/dL         BUN/Creatinine Ratio 11.9       Anion Gap 13.3 mmol/L       Magnesium [[id number]]  (Normal) Collected:  10/15/18 1320     Specimen:  Blood Updated:  10/15/18 1358       Magnesium 2.4 mg/dL       Protime-INR [[id number]]  (Normal) Collected:  10/15/18 1320     Specimen:  Blood Updated:  10/15/18 1345       Protime 12.6 Seconds         INR 0.96     TSH [[id number]]  (Abnormal) Collected:  10/15/18 1320     Specimen:  Blood Updated:  10/15/18 1405       TSH 15.410 (H) mIU/mL       CBC Auto Differential [[id number]]  (Abnormal) Collected:  10/15/18 1320     Specimen:  Blood Updated:  10/15/18 1339       WBC 11.35 (H) 10*3/mm3          RBC 5.15 10*6/mm3         Hemoglobin 16.1 g/dL         Hematocrit 47.2 %         MCV 91.7 fL         MCH 31.3 pg         MCHC 34.1 g/dL         RDW 12.3 %         RDW-SD 41.4 fl         MPV 10.5 fL         Platelets 292 10*3/mm3         Neutrophil % 69.4 %         Lymphocyte % 20.8 %         Monocyte % 7.7 %         Eosinophil % 1.6 %         Basophil % 0.5 %         Immature Grans % 1.4 (H) %         Neutrophils, Absolute 7.88 10*3/mm3         Lymphocytes, Absolute 2.36 10*3/mm3         Monocytes, Absolute 0.87 10*3/mm3         Eosinophils, Absolute 0.18 10*3/mm3         Basophils, Absolute 0.06 10*3/mm3         Immature Grans, Absolute 0.16 (H) 10*3/mm3               I ordered the above labs and reviewed the results     RADIOLOGY  XR Chest 1 View   Preliminary Result   No acute process.                 I ordered the above noted radiological studies. Interpreted by radiologist. Reviewed by me in PACS.         PROCEDURES  Critical Care  Performed by: ABUNDIO EID  Authorized by: ABUNDIO EID   Critical care provider statement:     Critical care time (minutes):  30    Critical care time was exclusive of:  Separately billable procedures and treating other patients    Critical care was necessary to treat or prevent imminent or life-threatening deterioration of the following conditions:  Cardiac failure    Critical care was time spent personally by me on the following activities:  Ordering and performing treatments and interventions, ordering and review of laboratory studies, ordering and review of radiographic studies, pulse oximetry, re-evaluation of patient's condition, review of old charts, obtaining history from patient or surrogate, examination of patient, evaluation of patient's response to treatment, discussions with consultants and development of treatment plan with patient or surrogate        EKG                                 EKG time: 1312  Rhythm/Rate: SVT, 200s  P waves and IL: not well  appreciated  QRS, axis: narrow complex   ST and T waves: nonspecific ST and T wave findings diffusely      Interpreted Contemporaneously by me, independently viewed  unchanged compared to prior 10/13/18     EKG                                 EKG time: 1346  Rhythm/Rate: sinus tachycardia, 110s  P waves and NE: normal  QRS, axis: normal   ST and T waves: normal      Interpreted Contemporaneously by me, independently viewed  changed compared to prior today at 1312        PROGRESS AND CONSULTS         1315 - CXR, lab work ordered for further evaluation.      1321 - Adenosine ordered.      1340 - Discussed pt care with Dr. Thomas (Cardiology) who reports that the pt is suitable for adenosine. He states that the pt can follow up in office.      1344 - Rechecked pt. Pt is in NAD. Administered 6 mg of adenosine. HR went from 200s to 120s. Pt reports his resting rate is in the 120s.      1347 - IVF ordered.      1348 - Repeat EKG ordered after the pt converted.      1350 - Lopressor ordered.      1401 - Discussed pt care with Lucia Benson (Mid level for Dr. Thomas) she states that Dr. Thomas wants to send the pt to [facility] today for oblation tomorrow morning. Informed pt of this discussion and he agrees with the plan for admission. Pt understands and agrees with plan. All questions answered.      MEDICAL DECISION MAKING  Results were reviewed/discussed with the patient and they were also made aware of online access. Pt also made aware that some labs, such as cultures, will not be resulted during ER visit and follow up with PMD is necessary.      MDM  Number of Diagnoses or Management Options     Amount and/or Complexity of Data Reviewed  Clinical lab tests: ordered and reviewed (Troponin - negative  TSH - 15.410)  Tests in the radiology section of CPT®: ordered and reviewed (CXR - no acute process)  Tests in the medicine section of CPT®: reviewed and ordered (See EKG procedure note.)  Decide to obtain previous medical  records or to obtain history from someone other than the patient: yes  Review and summarize past medical records: yes (Pt has been seen in the ER on 10/12 and 10/13 for SVT. He received adenosine on 10/13 and was converted. )  Discuss the patient with other providers: yes (Dr. Thomas (Cardiology))  Critical Care  Total time providing critical care: 30-74 minutes           DIAGNOSIS  Final diagnoses:   SVT (supraventricular tachycardia) (CMS/HCC)         DISPOSITION  ADMISSION     Discussed treatment plan and reason for admission with pt/family and admitting physician.  Pt/family voiced understanding of the plan for admission for further testing/treatment as needed.         Latest Documented Vital Signs:  As of 2:37 PM  BP- 139/84 HR- 114 Temp- 98.8 °F (37.1 °C) (Tympanic) O2 sat- 100%     --  Documentation assistance provided by pranav Miramontes for Dr. Campos.  Information recorded by the scribe was done at my direction and has been verified and validated by me.     Kwesi Miramontes  10/15/18 1440                       History & Physical      Lucia Benson APRN at 10/15/2018  4:09 PM                      Electrophysiology History & Physical    Date of Hospital Visit: 10/15/2018  1:09 PM  Encounter Provider: JOSE Rico  Place of Service: [facility] CARDIOLOGY  Patient Name: Juanito Servin  :1975  Referral Provider: Jayden Thomas MD      Chief complaint: Heart racing     History of Present Illness:    42 year old male with history of HTN and longstanding history of palpitations and heart racing episodes. He presented to ED for the 4th time in the last month with prolonged tachycardia that he could not break with vagal maneuvers. He has had these for over 10 years and could usually break them but has not been able to break the most recent ones. He says he usually lets it go on for a couple of hours before coming to ED. He presented in SVT, 's and was given  "adenosine and metoprolol and converted to SR. Since this is his 4th time in a month, admitted him for possible ablation.     He was seen in [place] for SVT and was told they thought he had WPW but he never followed up. He was \"off the rails\" for awhile and doing drugs but has been clean now for about 4 months. Episodes start abruptly and use to stop abruptly with vagal maneuvers but not recently, increasing in frequency and duration.      He had no chest pain, shortness of breath, PND, orthopnea or edema.     He was given metoprolol recently on on of his ED visits but has not been taking that.     Past Medical History:   Diagnosis Date   • Hypertension    • SVT (supraventricular tachycardia) (CMS/HCC)    • WPW (Elinor-Parkinson-White syndrome)          Past Surgical History:   Procedure Laterality Date   • FEMUR SURGERY         History reviewed. No pertinent family history.    Prescriptions Prior to Admission   Medication Sig Dispense Refill Last Dose   • lisinopril (PRINIVIL,ZESTRIL) 40 MG tablet Take 40 mg by mouth Daily.      • metoprolol succinate XL (TOPROL-XL) 25 MG 24 hr tablet Take 2 tablets by mouth Daily. 30 tablet 0        Current Meds  No current facility-administered medications on file prior to encounter.      Current Outpatient Prescriptions on File Prior to Encounter   Medication Sig Dispense Refill   • metoprolol succinate XL (TOPROL-XL) 25 MG 24 hr tablet Take 2 tablets by mouth Daily. 30 tablet 0         Social History     Social History   • Marital status: Single     Spouse name: N/A   • Number of children: N/A   • Years of education: N/A     Occupational History   • Not on file.     Social History Main Topics   • Smoking status: Former Smoker     Types: Cigarettes     Quit date: 2010   • Smokeless tobacco: Current User     Types: Chew   • Alcohol use No   • Drug use: No      Comment: Past use of meth -- 4 mos clean   • Sexual activity: Defer     Other Topics Concern   • Not on file     Social " "History Narrative   • No narrative on file         REVIEW OF SYSTEMS: All systems reviewed. Pertinent positives identified in HPI. All other systems are negative.     12 point ROS was performed and is negative except as outlined in HPI            Objective:     Vitals:    10/15/18 1521 10/15/18 1522 10/15/18 1551 10/15/18 1552   BP: (!) 147/117 (!) 139/103 139/99 139/99   BP Location: Right arm Right arm Right arm Right arm   Patient Position: Lying Lying Lying Lying   Pulse: 94  83 94   Resp: 18      Temp:       TempSrc:       SpO2: 95%      Weight: 102 kg (225 lb 3.2 oz)      Height: 182.9 cm (72\")        Body mass index is 30.54 kg/m².  Flowsheet Rows      First Filed Value   Admission Height  182.9 cm (72\") Documented at 10/15/2018 1308   Admission Weight  102 kg (224 lb 11.2 oz) Documented at 10/15/2018 1324          PHYSICAL EXAM:    Vitals Reviewed.   General Appearance: No acute distress, well developed and well nourished.   Eyes: Conjunctiva and lids: No erythema, swelling, or discharge. Sclera non-icteric.   HENT: Atraumatic, normocephalic. External eyes, ears, and nose normal.   Respiratory: No signs of respiratory distress. Respiration rhythm and depth normal.   Clear to auscultation. No rales, crackles, rhonchi, or wheezing auscultated.   Cardiovascular:  Jugular Venous Pressure: Normal  Heart Rate and Rhythm: Normal, Heart Sounds: Normal S1 and S2. No S3 or S4 noted.  Murmurs: No murmurs noted. No rubs, thrills, or gallops.   Arterial Pulses: Posterior tibialis and dorsalis pedis pulses normal.   Lower Extremities: No edema noted.  Gastrointestinal:  Abdomen soft, non-distended, non-tender. Normal bowel sounds.   Musculoskeletal: Normal movement of extremities  Skin: Warm and dry.   Psychiatric: Patient alert and oriented to person, place, and time. Speech and behavior appropriate. Normal mood and affect.                                                                       Lab Review:    Results from " last 7 days  Lab Units 10/15/18  1320 10/12/18  2359   SODIUM mmol/L 142 143   POTASSIUM mmol/L 4.3 4.7   CHLORIDE mmol/L 102 105   CO2 mmol/L 26.7 29.7*   BUN mg/dL 15 16   CREATININE mg/dL 1.26 1.32*   GLUCOSE mg/dL 88 105*   CALCIUM mg/dL 9.9 8.6   AST (SGOT) U/L 27 44*   ALT (SGPT) U/L 33 21       Results from last 7 days  Lab Units 10/15/18  1320 10/12/18  2359   TROPONIN T ng/mL <0.010 <0.010       Results from last 7 days  Lab Units 10/15/18  1320   WBC 10*3/mm3 11.35*   HEMOGLOBIN g/dL 16.1   HEMATOCRIT % 47.2   PLATELETS 10*3/mm3 292       Results from last 7 days  Lab Units 10/15/18  1320   INR  0.96           Results from last 7 days  Lab Units 10/15/18  1320   MAGNESIUM mg/dL 2.4                   Results from last 7 days  Lab Units 10/15/18  1320   TSH mIU/mL 15.410*         Imaging:     Study Result     AP CHEST 10/15/2018      HISTORY: Dysrhythmia.     FINDINGS: The heart size is within normal limits. Lungs appear free of  acute infiltrates. Bones and soft tissues are unremarkable.     IMPRESSION:  No acute process.         EKG:               I personally viewed and interpreted the patient's EKG/Telemetry data    Assessment:      SVT (supraventricular tachycardia) (CMS/[facility])       Plan:       Dr. Thomas and I saw Mr. Servin. It does look like he has a left lateral concealed pathway/SVT. Discussed treatment options and recommend ablation since he has had increase in frequency and duration. Discussed risks and benefits. He is agreeable, will schedule for tomorrow.       JOSE Rico  [facility]  10/15/18  4:09 PM      Electronically signed by Lucia Benson APRN at 10/15/2018  4:44 PM          ICU Vital Signs     Row Name 10/17/18 0824 10/17/18 0815 10/17/18 0403 10/17/18 0010 10/16/18 2300       Vitals    Temp  -- 98.2 °F (36.8 °C) 98.3 °F (36.8 °C)  -- 97.9 °F (36.6 °C)    Temp src  -- Oral Oral  -- Oral    Pulse  --  -- 85  -- 92    Heart Rate Source  -- Monitor Monitor   -- Monitor    Resp  -- 18 18 -- 18    Resp Rate Source  -- Visual Visual  -- Visual    BP  -- 137/79 127/90  -- 132/94    BP Location  -- Right arm  --  -- Right arm    BP Method  -- Automatic Automatic  -- Automatic    Patient Position  -- Lying Lying  -- Lying       Oxygen Therapy    SpO2  --  -- 98 %  -- 96 %    Pulse Oximetry Type  -- Continuous Continuous  --  --    Device (Oxygen Therapy) room air room air room air room air room air    Row Name 10/16/18 2050 10/16/18 1900 10/16/18 1716 10/16/18 1700 10/16/18 1625       Vitals    Temp  -- 97.8 °F (36.6 °C)  --  --  --    Temp src  -- Oral  --  --  --    Pulse  -- 86  -- 108  --    Heart Rate Source  -- Monitor  -- Monitor  --    Resp  -- 18  -- 18 16    Resp Rate Source  -- Visual  -- Visual  --    BP  -- 143/93 141/93 (!)  139/117  --    BP Location  -- Right arm  -- Right arm  --    BP Method  -- Automatic  -- Automatic  --    Patient Position  -- Lying  -- Lying  --       Oxygen Therapy    SpO2  -- 91 %  -- 95 %  --    Pulse Oximetry Type  --  --  -- Continuous  --    Device (Oxygen Therapy) room air room air  -- room air  --       Vitals    Temp  --  --  -- 97.2 °F (36.2 °C)  --    Temp src  --  --  -- Oral  --    Pulse  --  --  -- 88  --    Heart Rate Source  --  --  -- Monitor  --    Resp  -- 16  -- 20  --    Resp Rate Source  --  --  -- Visual  --    BP  --  --  -- 119/76  --    BP Location  --  --  -- Right arm  --    BP Method  --  --  -- Automatic  --    Patient Position  --  --  -- Lying  --       Oxygen Therapy    SpO2  --  --  -- 97 %  --    Pulse Oximetry Type Continuous  --  -- Intermittent  --    Device (Oxygen Therapy) nasal cannula  -- room air room air room air    Flow (L/min) 3  --  --  --  --    Row Name 10/16/18 0800 10/16/18 1233 10/15/18 4471 10/15/18 2045 10/15/18 2020       Vitals    Temp 97.7 °F (36.5 °C)  -- 98 °F (36.7 °C)  --  --    Temp src Oral  -- Oral  --  --    Pulse 97  --  --  --  --    Heart Rate Source Monitor  -- Monitor  "Monitor  --    Resp 18  -- 16 17  --    Resp Rate Source Visual  -- Visual Visual  --    /92  -- 136/92  --  --    Noninvasive MAP (mmHg)  --  -- 104  --  --    BP Location Right arm  -- Right arm Right arm  --    BP Method Automatic  -- Automatic Automatic  --    Patient Position Lying  -- Lying Lying  --       Oxygen Therapy    SpO2 95 %  --  --  --  --    Pulse Oximetry Type Intermittent  --  --  --  --    Device (Oxygen Therapy) room air room air  --  -- room air    Row Name 10/15/18 1900 10/15/18 1552 10/15/18 1551 10/15/18 1544 10/15/18 1522       Vitals    Temp 98.3 °F (36.8 °C)  --  --  --  --    Temp src Oral  --  --  --  --    Pulse 96 94 83  --  --    Heart Rate Source Monitor  --  --  --  --    Resp 18  --  --  --  --    Resp Rate Source Visual  --  --  --  --    BP (!)  138/109 139/99 139/99  -- (!)  139/103    Noninvasive MAP (mmHg)  --  -- 114  --  --    BP Location Right arm Right arm Right arm  -- Right arm    BP Method Automatic Automatic Automatic  -- Automatic    Patient Position Lying Lying Lying  -- Lying       Oxygen Therapy    SpO2 95 %  --  --  --  --    Pulse Oximetry Type Continuous  --  --  --  --    Device (Oxygen Therapy) room air  -- room air room air  --    Row Name 10/15/18 1521 10/15/18 1445 10/15/18 13:48:12 10/15/18 1333 10/15/18 1324       Height and Weight    Height 182.9 cm (72\")  --  --  --  --    Height Method Stated  --  --  --  --    Weight 102 kg (225 lb 3.2 oz)  --  --  -- 102 kg (224 lb 11.2 oz)    Weight Method Standing scale  --  --  -- Bed scale    Ideal Body Weight (IBW) (kg) 82.07  --  --  --  --    BSA (Calculated - sq m) 2.24 sq meters  --  --  --  --    BMI (Calculated) 30.5  --  --  --  --    Weight in (lb) to have BMI = 25 183.9  --  --  --  --       Vitals    Pulse 94 98 114 (!)  205  --    Heart Rate Source Monitor Monitor Monitor  --  --    Resp 18 18 18  --  --    Resp Rate Source Visual Visual Visual  --  --    BP (!)  147/117 142/98 139/84 109/91  " "--    Noninvasive MAP (mmHg) 138  --  -- 94  --    BP Location Right arm Right arm Right arm  --  --    BP Method Automatic Automatic Automatic  --  --    Patient Position Lying Lying Lying  --  --       Oxygen Therapy    SpO2 95 % 98 % 100 % 99 %  --    Pulse Oximetry Type  -- Continuous Continuous  --  --    Device (Oxygen Therapy)  -- room air room air  --  --    Row Name 10/15/18 1317 10/15/18 1308                Height and Weight    Height  -- 182.9 cm (72\")       Height Method  -- Stated       Ideal Body Weight (IBW) (kg)  -- 82.07       Weight in (lb) to have BMI = 25  -- 183.9          Vitals    Temp  -- 98.8 °F (37.1 °C)       Temp src  -- Tympanic       Pulse (!)  210  --       Heart Rate Source Monitor  --       Resp  -- 18       BP (!)  135/105  --       BP Method Automatic  --       Patient Position Lying  --          Oxygen Therapy    SpO2 100 %  --       Pulse Oximetry Type Continuous  --       Device (Oxygen Therapy) room air  --           Lines, Drains & Airways    Active LDAs     Name:   Placement date:   Placement time:   Site:   Days:    Peripheral IV 10/15/18 1319 Left Antecubital  10/15/18    1319    Antecubital    1         Inactive LDAs     Name:   Placement date:   Placement time:   Removal date:   Removal time:   Site:   Days:    [REMOVED] Venous Sheath Other (Comment) Left Femoral  10/16/18    1434    10/16/18    1620    Femoral    less than 1    [REMOVED] Venous Sheath 8 Fr. Right Femoral  10/16/18    1434    10/16/18    1620    Femoral    less than 1    [REMOVED] Venous Sheath 8 Fr. Right Femoral  10/16/18    1448    10/16/18    1619    Femoral    less than 1                Hospital Medications (all)       Dose Frequency Start End    acetaminophen (TYLENOL) tablet 650 mg 650 mg Every 4 Hours PRN 10/15/2018     Sig - Route: Take 2 tablets by mouth Every 4 (Four) Hours As Needed for Mild Pain . - Oral    adenosine (ADENOCARD) injection 6 mg 6 mg Once 10/15/2018 10/15/2018    Sig - Route: " Infuse 2 mL into a venous catheter 1 (One) Time. - Intravenous    aspirin EC tablet 81 mg 81 mg Daily 10/16/2018     Sig - Route: Take 1 tablet by mouth Daily. - Oral    lisinopril (PRINIVIL,ZESTRIL) tablet 40 mg 40 mg Daily 10/15/2018     Sig - Route: Take 1 tablet by mouth Daily. - Oral    metoprolol tartrate (LOPRESSOR) injection 5 mg 5 mg Once 10/15/2018 10/15/2018    Sig - Route: Infuse 5 mL into a venous catheter 1 (One) Time. - Intravenous    nitroglycerin (NITROSTAT) SL tablet 0.4 mg 0.4 mg Every 5 Minutes PRN 10/15/2018     Sig - Route: Place 1 tablet under the tongue Every 5 (Five) Minutes As Needed for Chest Pain (Chest Pain With Systolic Blood Pressure Greater Than 100). - Sublingual    sodium chloride 0.9 % bolus 1,000 mL 1,000 mL Once 10/15/2018 10/15/2018    Sig - Route: Infuse 1,000 mL into a venous catheter 1 (One) Time. - Intravenous    sodium chloride 0.9 % flush 10 mL 10 mL As Needed 10/15/2018     Sig - Route: Infuse 10 mL into a venous catheter As Needed for Line Care. - Intravenous    sodium chloride 0.9 % flush 3 mL 3 mL Every 12 Hours Scheduled 10/15/2018     Sig - Route: Infuse 3 mL into a venous catheter Every 12 (Twelve) Hours. - Intravenous    sodium chloride 0.9 % flush 3-10 mL 3-10 mL As Needed 10/15/2018     Sig - Route: Infuse 3-10 mL into a venous catheter As Needed for Line Care. - Intravenous    sodium chloride 0.9 % infusion  Continuous PRN 10/16/2018 10/16/2018    Sig: Continuous As Needed.    fentaNYL citrate (PF) (SUBLIMAZE) injection (Discontinued)  As Needed 10/16/2018 10/16/2018    Sig: As Needed.    Reason for Discontinue: Patient Discharge    heparin (porcine) injection (Discontinued)  As Needed 10/16/2018 10/16/2018    Sig: As Needed.    Reason for Discontinue: Patient Discharge    lidocaine-EPINEPHrine (XYLOCAINE W/EPI) 1 %-1:534874 injection (Discontinued)  As Needed 10/16/2018 10/16/2018    Sig: As Needed.    Reason for Discontinue: Patient Discharge    midazolam  (VERSED) injection (Discontinued)  As Needed 10/16/2018 10/16/2018    Sig: As Needed.    Reason for Discontinue: Patient Discharge    midazolam (VERSED) injection (Discontinued)  As Needed 10/16/2018 10/16/2018    Sig: As Needed.    Reason for Discontinue: Patient Discharge    protamine injection (Discontinued)  As Needed 10/16/2018 10/16/2018    Sig: As Needed.    Reason for Discontinue: Patient Discharge          Orders (last 72 hrs)     Start     Ordered    10/17/18 0658  POC Activated Clotting Time  Once      10/17/18 0657    10/17/18 0658  POC Activated Clotting Time  Once      10/17/18 0657    10/17/18 0658  POC Activated Clotting Time  Once      10/17/18 0657    10/17/18 0113  Diet Regular; Cardiac  Diet Effective Now      10/17/18 0113    10/16/18 1700  aspirin EC tablet 81 mg  Daily      10/16/18 1635    10/16/18 1632  Bedrest w head flat for 3 hours Then remove stopcocks -- then 1 hour bedrest before moving May have hob to 20-30 degrees during bed rest  Misc Nursing Order (Specify)  Once     Comments:  Bedrest w head flat for 3 hours   Then remove stopcocks -- then 1 hour bedrest before moving  May have hob to 20-30 degrees during bed rest    10/16/18 1635    10/16/18 1614  protamine injection  As Needed,   Status:  Discontinued      10/16/18 1614    10/16/18 1603  fentaNYL citrate (PF) (SUBLIMAZE) injection  As Needed,   Status:  Discontinued      10/16/18 1603    10/16/18 1431  heparin (porcine) injection  As Needed,   Status:  Discontinued      10/16/18 1432    10/16/18 1426  lidocaine-EPINEPHrine (XYLOCAINE W/EPI) 1 %-1:686744 injection  As Needed,   Status:  Discontinued      10/16/18 1426    10/16/18 1357  midazolam (VERSED) injection  As Needed,   Status:  Discontinued      10/16/18 1357    10/16/18 1355  midazolam (VERSED) injection  As Needed,   Status:  Discontinued      10/16/18 1355    10/16/18 1354  sodium chloride 0.9 % infusion  Continuous PRN      10/16/18 1356    10/16/18 0001  NPO Diet   Diet Effective Midnight,   Status:  Canceled      10/15/18 1614    10/15/18 2100  sodium chloride 0.9 % flush 3 mL  Every 12 Hours Scheduled      10/15/18 1614    10/15/18 2000  Vital Signs  Every 4 Hours      10/15/18 1614    10/15/18 1700  lisinopril (PRINIVIL,ZESTRIL) tablet 40 mg  Daily      10/15/18 1614    10/15/18 1614  Obtain Informed Consent  Once      10/15/18 1614    10/15/18 1613  acetaminophen (TYLENOL) tablet 650 mg  Every 4 Hours PRN      10/15/18 1614    10/15/18 1613  Pulse Oximetry,  Spot  Once      10/15/18 1614    10/15/18 1613  Cardiac Monitoring  Continuous,   Status:  Canceled      10/15/18 1614    10/15/18 1613  Activity - Ad Dipika  Until Discontinued      10/15/18 1614    10/15/18 1613  Saline Lock  Continuous      10/15/18 1614    10/15/18 1613  Continuous Cardiac Monitoring  Continuous      10/15/18 1614    10/15/18 1613  Continuous Pulse Oximetry  Continuous     Comments:  For Unresponsiveness, Acute Dyspnea, Cyanosis or Suspected Hypoxemia.  Notify Physician    10/15/18 1614    10/15/18 1613  May Be Off Telemetry for Tests  Continuous      10/15/18 1614    10/15/18 1613  ACLS Protocol For Life Threatening Dysrhythmias (Unless Code Status Indicates Otherwise)  Continuous      10/15/18 1614    10/15/18 1613  Notify Provider if ACLS Protocol Activated  Until Discontinued      10/15/18 1614    10/15/18 1613  Diet Regular  Diet Effective Now,   Status:  Canceled      10/15/18 1614    10/15/18 1612  nitroglycerin (NITROSTAT) SL tablet 0.4 mg  Every 5 Minutes PRN      10/15/18 1614    10/15/18 1612  Code Status and Medical Interventions:  Continuous      10/15/18 1614    10/15/18 1612  Weigh Patient  Once      10/15/18 1614    10/15/18 1612  Insert Peripheral IV  Once      10/15/18 1614    10/15/18 1612  Saline Lock & Maintain IV Access  Continuous,   Status:  Canceled      10/15/18 1614    10/15/18 1612  VTE Prophylaxis Not Indicated: Obesity-BMI >30 (1); </= 3 (Low Risk)  Once      10/15/18  1614    10/15/18 1611  sodium chloride 0.9 % flush 3-10 mL  As Needed      10/15/18 1614    10/15/18 1431  EP/CRM Study  Once      10/15/18 1430    10/15/18 1421  Case Request EP Lab: Ablation SVT  Once      10/15/18 1420    10/15/18 1404  Critical Care  Once     Comments:  This order was created via procedure documentation    10/15/18 1403    10/15/18 1403  Inpatient Admission  Once      10/15/18 1403    10/15/18 1352  metoprolol tartrate (LOPRESSOR) injection 5 mg  Once      10/15/18 1350    10/15/18 1351  ECG 12 Lead  Once,   Status:  Canceled      10/15/18 1351    10/15/18 1349  sodium chloride 0.9 % bolus 1,000 mL  Once      10/15/18 1347    10/15/18 1348  ECG 12 Lead  Once      10/15/18 1348    10/15/18 1323  adenosine (ADENOCARD) injection 6 mg  Once      10/15/18 1321    10/15/18 1316  Cardiac monitoring  Per Hospital Policy,   Status:  Canceled      10/15/18 1315    10/15/18 1316  Continuous Pulse Oximetry  Per Hospital Policy,   Status:  Canceled      10/15/18 1315    10/15/18 1316  Oxygen Therapy- Nasal Cannula; 2 LPM; Titrate for SPO2: 92%  Continuous,   Status:  Canceled      10/15/18 1315    10/15/18 1316  Vital Signs  Per Hospital Policy      10/15/18 1315    10/15/18 1316  ECG 12 Lead  Once      10/15/18 1315    10/15/18 1316  XR Chest 1 View  1 Time Imaging      10/15/18 1315    10/15/18 1316  Insert peripheral IV  Once      10/15/18 1315    10/15/18 1316  Cochise Draw  Once      10/15/18 1315    10/15/18 1316  Troponin  Once      10/15/18 1315    10/15/18 1316  CBC & Differential  Once      10/15/18 1315    10/15/18 1316  Comprehensive Metabolic Panel  Once      10/15/18 1315    10/15/18 1316  Magnesium  Once      10/15/18 1315    10/15/18 1316  Protime-INR  Once      10/15/18 1315    10/15/18 1316  TSH  Once      10/15/18 1315    10/15/18 1316  Light Blue Top  PROCEDURE ONCE      10/15/18 1315    10/15/18 1316  Green Top (Gel)  PROCEDURE ONCE      10/15/18 1315    10/15/18 1316  Lavender Top   PROCEDURE ONCE      10/15/18 1315    10/15/18 1316  Gold Top - SST  PROCEDURE ONCE      10/15/18 1315    10/15/18 1316  CBC Auto Differential  PROCEDURE ONCE      10/15/18 1315    10/15/18 1315  sodium chloride 0.9 % flush 10 mL  As Needed      10/15/18 1315    10/15/18 1308  ECG 12 Lead  Once      10/15/18 1308    Unscheduled  Oxygen Therapy- Nasal Cannula; Titrate for SPO2: 90%, equal to or greater than  Continuous PRN     Comments:  For Unresponsiveness, Acute Dyspnea, Cyanosis or Suspected Hypoxemia.  Notify Physician    10/15/18 1614    Unscheduled  ECG 12 Lead  As Needed     Comments:  Nurse to Release if Patient Expericences Acute Chest Pain or Dysrhythmias    10/15/18 1614    Unscheduled  Potassium  As Needed     Comments:  For Ventricular Arrhythmias      10/15/18 1614    Unscheduled  Magnesium  As Needed     Comments:  For Ventricular Arrhythmias      10/15/18 1614    Unscheduled  Troponin  As Needed     Comments:  For Chest Pain      10/15/18 1614    Unscheduled  Digoxin Level  As Needed     Comments:  For Atrial Arrhythmias      10/15/18 1614    Unscheduled  Blood Gas, Arterial  As Needed     Comments:  Per O2 PolicyNotify Physician      10/15/18 1614    --  lisinopril (PRINIVIL,ZESTRIL) 40 MG tablet  Daily      10/15/18 1539                                 [facility] CATH LAB  [address] [facility] [id number] [phone]      Juanito Servin   EP/CRM Study   Order# [id number]   Reading physician: Jayden Thomas MD Ordering physician: Lucia Benson APRN Study date: 10/16/18    Procedures     Ablation SVT      Patient Information     Patient Name  Juanito Servin MRN  [id number] Sex  Male  (Age)  1975 (42 y.o.)   Admission Information     Admission Date/Time Discharge Date/Time Room/Bed   10/15/18  1309  2210/1   Patient Hx Of Height, Weight, and Vitals     Height Weight BSA (Calculated - sq m) BMI (kg/m2) Pulse BP           Physicians     Panel Physicians Referring  Physician Case Authorizing Physician   Jayden Thomas MD (Primary) Jayden Thomas MD Swift, Vicky A, APRN   Indications     SVT (supraventricular tachycardia) (CMS/[facility]) [I47.1 (ICD-10-CM)]   Pre-procedure Diagnosis     svt       Conclusion     Comprehensive electrophysiology study, left atrial pacing and sensing, LV pacing, transseptal catheterization. 3-dimensional cardiac mapping, SVT induction. Ablation of SVT due to an accessory pathway.       The patient was taken to the Cath Lab. Anesthesia was local and moderate sedation. The right and left groin was prepped and draped in the usual sterile fashion. With ultrasound guidance I placed a 12- Kenyan sheath into the left and two 8-Kenyan sheaths in the right femoral vein using a micropuncture technique. There was no difficulty getting access.     I then placed three quadripolar catheters into the right atrium, his-purkinje region and rv outflow tract. I placed a 10-pole catheter in the CS for LA pace and sensing.      These catheters were used for pacing and sensing.     RV pacing showed 1-1 retro = 280 w DCS first c/w left lat pathway. AP-ERP at 550 < 290     1:1 anterograde = 320  AVNERP at 500 < 260     Tachy --  VADCS = 80; VAPCS = 91; VA his 127; VAhis 151; ; clearly AVRT with orthodromic.        I then did a transeptal cath -- no trouble. I placed a saline-irrigated cath through the preface sheath and mapped the MA and LV. It was obv that activation was over LAP as VA short. Ablation there was succ but it came back at 5 minutes. Then I remapped and reablted w AP disappearance at 2 seconds into the burn.     Then I did LV pacing and no AP for 30 min.     VA block w LV and RV pacing          Findings:     The His bundle electrogram recorded a AH interval of 80 ms and HV of 35 ms.     The clinical tachy was orthodromic AVRT with left lateral AP as the retrograde limb      Succ ablation of a left LAT AP via transseptal   Normal AV node  Normal HV    No AVNRT          Radiation      Event Details User   4:22 PM Radiation Tracking Cumulative Air Kerma: Total Dose (mGy) = 270.000  Physician: Jayden Thomas MD  Dose (mGy) = 270.000  Fluoro Time (mins) = 9.6 ES   Medications   (Filter: BH CV CONTRAST GROUPER Medications Shown)   None   Implants        No implant documentation for this case.   PACS Images     Show images for EP/CRM Study   Signed     Electronically signed by Jayden Thomas MD on 10/16/18 at 1637 EDT   Encounter-Level Cardiology Documents:

## 2018-10-17 NOTE — PLAN OF CARE
Problem: Patient Care Overview  Goal: Plan of Care Review  Outcome: Ongoing (interventions implemented as appropriate)   10/17/18 0602   Coping/Psychosocial   Plan of Care Reviewed With patient   Plan of Care Review   Progress improving   OTHER   Outcome Summary S/P SVT ablation. Bilateral groin sites clean, dry, intact, and soft. Room AIr. Vital Signs Stable. Will Continue to Monitor.        Problem: Arrhythmia/Dysrhythmia (Symptomatic) (Adult)  Goal: Signs and Symptoms of Listed Potential Problems Will be Absent, Minimized or Managed (Arrhythmia/Dysrhythmia)  Outcome: Ongoing (interventions implemented as appropriate)

## 2018-10-17 NOTE — PROGRESS NOTES
Discharge Planning Assessment  [facility]     Patient Name: Juanito Servin  MRN: [id number]  Today's Date: 10/17/2018    Admit Date: 10/15/2018          Discharge Needs Assessment     Row Name 10/17/18 1457       Living Environment    Lives With alone    Current Living Arrangements home/apartment/condo    Primary Care Provided by self    Provides Primary Care For no one    Family Caregiver if Needed none    Able to Return to Prior Arrangements yes       Resource/Environmental Concerns    Resource/Environmental Concerns none    Transportation Concerns car, none       Transition Planning    Patient/Family Anticipates Transition to home    Patient/Family Anticipated Services at Transition none       Discharge Needs Assessment    Concerns to be Addressed no discharge needs identified    Equipment Currently Used at Home none    Equipment Needed After Discharge none    Offered/Gave Vendor List yes    Discharge Coordination/Progress Home, no needs.             Discharge Plan     Row Name 10/17/18 1458       Plan    Plan Home, no needs.     Patient/Family in Agreement with Plan yes    Plan Comments CCP met with patient at bedside. Explained role of CCP, verified face sheet, and discussed d/c planning needs. Left HH/SNF list. Patient lives alone in a 3rd story apartment (45 steps estimated) with no elevator option. Patient uses no DME, has no history of SNF/HH services. Denies issues with medication management or transportation. 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 Patient has only recently relocated to [place] of state, he does not currently have a PCP established but reports he does not need assistance from CCP to arrange one on his behalf. Upon d/c, plan is to return home, no needs. -CHACE Lyn        Destination     No service coordination in this encounter.      Durable Medical Equipment     No service coordination in this encounter.       Dialysis/Infusion     No service coordination in this encounter.      Home Medical Care     No service coordination in this encounter.      Social Care     No service coordination in this encounter.        Expected Discharge Date and Time     Expected Discharge Date Expected Discharge Time    Oct 17, 2018               Demographic Summary     Row Name 10/17/18 1456       General Information    Admission Type inpatient    Arrived From home    Referral Source physician    Reason for Consult discharge planning    Preferred Language English     Used During This Interaction no            Functional Status     Row Name 10/17/18 1456       Functional Status    Usual Activity Tolerance good    Current Activity Tolerance good       Functional Status, IADL    Medications independent    Meal Preparation independent    Housekeeping independent    Laundry independent    Shopping independent       Mental Status    General Appearance WDL WDL       Mental Status Summary    Recent Changes in Mental Status/Cognitive Functioning no changes       Employment/    Employment Status employed full time            Psychosocial    No documentation.           Abuse/Neglect    No documentation.           Legal    No documentation.           Substance Abuse     Row Name 10/17/18 1457       Substance Use    Substance Use Status past alcohol use    Previous Substance Use Treatment support group   active member of AA            Patient Forms    No documentation.         CHACE Lyn
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
## 2018-10-18 ENCOUNTER — READMISSION MANAGEMENT (OUTPATIENT)
Dept: CALL CENTER | Facility: HOSPITAL | Age: 43
End: 2018-10-18

## 2018-10-18 NOTE — OUTREACH NOTE
Prep Survey      Responses   Facility patient discharged from?  [place]   Is patient eligible?  Yes   Discharge diagnosis  SVT - converted with meds,  Successful ablation   Does the patient have one of the following disease processes/diagnoses(primary or secondary)?  Other   Does the patient have Home health ordered?  No   Is there a DME ordered?  No   Comments regarding appointments  new to [place] - doesn't have a PCP   Medication alerts for this patient  ASA started    Prep survey completed?  Yes          Samina Campbell RN

## 2018-10-19 ENCOUNTER — READMISSION MANAGEMENT (OUTPATIENT)
Dept: CALL CENTER | Facility: HOSPITAL | Age: 43
End: 2018-10-19

## 2018-10-19 RX ORDER — LEVOTHYROXINE SODIUM 0.1 MG/1
100 TABLET ORAL DAILY
Qty: 30 TABLET | Refills: 1 | Status: SHIPPED | OUTPATIENT
Start: 2018-10-19 | End: 2021-11-25 | Stop reason: HOSPADM

## 2018-10-19 NOTE — OUTREACH NOTE
Medical Week 1 Survey      Responses   Facility patient discharged from?  [facility]   Does the patient have one of the following disease processes/diagnoses(primary or secondary)?  Other   Is there a successful TCM telephone encounter documented?  No   Week 1 attempt successful?  No   Unsuccessful attempts  Attempt 1          Tiffani Ryan RN

## 2018-10-23 ENCOUNTER — READMISSION MANAGEMENT (OUTPATIENT)
Dept: CALL CENTER | Facility: HOSPITAL | Age: 43
End: 2018-10-23

## 2018-10-23 NOTE — OUTREACH NOTE
Medical Week 1 Survey      Responses   Facility patient discharged from?  [facility]   Does the patient have one of the following disease processes/diagnoses(primary or secondary)?  Other   Is there a successful TCM telephone encounter documented?  No   Week 1 attempt successful?  No   Unsuccessful attempts  Attempt 2          Wisam Stokes RN

## 2018-10-25 ENCOUNTER — READMISSION MANAGEMENT (OUTPATIENT)
Dept: CALL CENTER | Facility: HOSPITAL | Age: 43
End: 2018-10-25

## 2018-10-25 NOTE — OUTREACH NOTE
Medical Week 2 Survey      Responses   Facility patient discharged from?  [facility]   Does the patient have one of the following disease processes/diagnoses(primary or secondary)?  Other   Week 2 attempt successful?  Yes   Call start time  1837   Discharge diagnosis  SVT - converted with meds,  Successful ablation   Call end time  1844   Meds reviewed with patient/caregiver?  Yes   Is the patient having any side effects they believe may be caused by any medication additions or changes?  No   Does the patient have all medications ordered at discharge?  Yes   Is the patient taking all medications as directed (includes completed medication regime)?  Yes   Medication comments  Nothing new since discharge.   Does the patient have a primary care provider?   Yes   Has the patient kept scheduled appointments due by today?  Yes   Psychosocial issues?  No   Did the patient receive a copy of their discharge instructions?  Yes   Nursing interventions  Reviewed instructions with patient   What is the patient's perception of their health status since discharge?  Improving   Is the patient/caregiver able to teach back signs and symptoms related to disease process for when to call PCP?  Yes   Is the patient/caregiver able to teach back signs and symptoms related to disease process for when to call 911?  Yes   Is the patient/caregiver able to teach back the hierarchy of who to call/visit for symptoms/problems? PCP, Specialist, Home health nurse, Urgent Care, ED, 911  Yes   Additional teach back comments  He is doing better, he has a knot at the cath site now but he will keep an eye on it.   Week 2 Call Completed?  Yes          Keara Pierce RN

## 2018-11-01 ENCOUNTER — READMISSION MANAGEMENT (OUTPATIENT)
Dept: CALL CENTER | Facility: HOSPITAL | Age: 43
End: 2018-11-01

## 2018-11-01 NOTE — OUTREACH NOTE
Medical Week 3 Survey      Responses   Facility patient discharged from?  [facility]   Does the patient have one of the following disease processes/diagnoses(primary or secondary)?  Other   Week 3 attempt successful?  Yes   Call start time  1516   Call end time  1519   Discharge diagnosis  SVT - converted with meds,  Successful ablation   Meds reviewed with patient/caregiver?  Yes   Is the patient having any side effects they believe may be caused by any medication additions or changes?  No   Does the patient have all medications ordered at discharge?  Yes   Is the patient taking all medications as directed (includes completed medication regime)?  Yes   Comments regarding appointments  new to [place] - doesn't have a PCP   Does the patient have a primary care provider?   Yes   Does the patient have an appointment with their PCP within 7 days of discharge?  Yes   Has the patient kept scheduled appointments due by today?  Yes   Has home health visited the patient within 72 hours of discharge?  N/A   Psychosocial issues?  No   Did the patient receive a copy of their discharge instructions?  Yes   Nursing interventions  Reviewed instructions with patient   What is the patient's perception of their health status since discharge?  Improving   Is the patient/caregiver able to teach back signs and symptoms related to disease process for when to call PCP?  Yes   Is the patient/caregiver able to teach back signs and symptoms related to disease process for when to call 911?  Yes   Is the patient/caregiver able to teach back the hierarchy of who to call/visit for symptoms/problems? PCP, Specialist, Home health nurse, Urgent Care, ED, 911  Yes   Week 3 Call Completed?  Yes          Yaya Cuellar RN

## 2018-12-24 ENCOUNTER — HOSPITAL ENCOUNTER (EMERGENCY)
Facility: HOSPITAL | Age: 43
Discharge: HOME | End: 2018-12-24
Attending: EMERGENCY MEDICINE | Admitting: EMERGENCY MEDICINE

## 2018-12-24 VITALS
DIASTOLIC BLOOD PRESSURE: 110 MMHG | OXYGEN SATURATION: 96 % | BODY MASS INDEX: 30.48 KG/M2 | TEMPERATURE: 98.2 F | HEART RATE: 97 BPM | SYSTOLIC BLOOD PRESSURE: 137 MMHG | HEIGHT: 72 IN | WEIGHT: 225 LBS | RESPIRATION RATE: 18 BRPM

## 2018-12-24 DIAGNOSIS — K08.89 PAIN, DENTAL: ICD-10-CM

## 2018-12-24 DIAGNOSIS — K02.9 DENTAL CARIES: Primary | ICD-10-CM

## 2018-12-24 PROCEDURE — 99282 EMERGENCY DEPT VISIT SF MDM: CPT

## 2018-12-24 RX ORDER — IBUPROFEN 800 MG/1
800 TABLET ORAL EVERY 8 HOURS PRN
Qty: 15 TABLET | Refills: 0 | Status: SHIPPED | OUTPATIENT
Start: 2018-12-24 | End: 2021-11-25 | Stop reason: HOSPADM

## 2018-12-24 RX ORDER — PENICILLIN V POTASSIUM 500 MG/1
500 TABLET ORAL 4 TIMES DAILY
Qty: 28 TABLET | Refills: 0 | Status: SHIPPED | OUTPATIENT
Start: 2018-12-24 | End: 2018-12-31

## 2021-11-20 PROCEDURE — 99283 EMERGENCY DEPT VISIT LOW MDM: CPT

## 2021-11-20 PROCEDURE — 99284 EMERGENCY DEPT VISIT MOD MDM: CPT

## 2021-11-20 PROCEDURE — 36415 COLL VENOUS BLD VENIPUNCTURE: CPT

## 2021-11-21 ENCOUNTER — HOSPITAL ENCOUNTER (INPATIENT)
Facility: HOSPITAL | Age: 46
LOS: 4 days | Discharge: HOME | End: 2021-11-25
Attending: EMERGENCY MEDICINE | Admitting: HOSPITALIST

## 2021-11-21 ENCOUNTER — APPOINTMENT (OUTPATIENT)
Dept: GENERAL RADIOLOGY | Facility: HOSPITAL | Age: 46
End: 2021-11-21

## 2021-11-21 ENCOUNTER — APPOINTMENT (OUTPATIENT)
Dept: NUCLEAR MEDICINE | Facility: HOSPITAL | Age: 46
End: 2021-11-21

## 2021-11-21 DIAGNOSIS — F15.20 METHAMPHETAMINE USE DISORDER, MODERATE, DEPENDENCE (HCC): ICD-10-CM

## 2021-11-21 DIAGNOSIS — Z72.0 TOBACCO ABUSE: ICD-10-CM

## 2021-11-21 DIAGNOSIS — F10.10 ALCOHOL USE DISORDER, MILD, ABUSE: ICD-10-CM

## 2021-11-21 DIAGNOSIS — R07.9 CHEST PAIN, UNSPECIFIED TYPE: Primary | ICD-10-CM

## 2021-11-21 DIAGNOSIS — R06.00 DYSPNEA, UNSPECIFIED TYPE: ICD-10-CM

## 2021-11-21 PROBLEM — I10 HTN (HYPERTENSION): Status: ACTIVE | Noted: 2021-11-21

## 2021-11-21 PROBLEM — R00.0 SINUS TACHYCARDIA: Status: ACTIVE | Noted: 2021-11-21

## 2021-11-21 PROBLEM — F19.10 POLYSUBSTANCE ABUSE (HCC): Status: ACTIVE | Noted: 2021-11-21

## 2021-11-21 PROBLEM — Z86.39 HISTORY OF HYPOTHYROIDISM: Status: ACTIVE | Noted: 2021-11-21

## 2021-11-21 LAB
ALBUMIN SERPL-MCNC: 4.6 G/DL (ref 3.5–5.2)
ALBUMIN/GLOB SERPL: 2 G/DL
ALP SERPL-CCNC: 84 U/L (ref 39–117)
ALT SERPL W P-5'-P-CCNC: 38 U/L (ref 1–41)
AMPHET+METHAMPHET UR QL: POSITIVE
ANION GAP SERPL CALCULATED.3IONS-SCNC: 11 MMOL/L (ref 5–15)
ANION GAP SERPL CALCULATED.3IONS-SCNC: 6.7 MMOL/L (ref 5–15)
AST SERPL-CCNC: 37 U/L (ref 1–40)
BARBITURATES UR QL SCN: NEGATIVE
BASOPHILS # BLD AUTO: 0.09 10*3/MM3 (ref 0–0.2)
BASOPHILS NFR BLD AUTO: 0.8 % (ref 0–1.5)
BENZODIAZ UR QL SCN: NEGATIVE
BH CV NUCLEAR PRIOR STUDY: 3
BH CV REST NUCLEAR ISOTOPE DOSE: 9.5 MCI
BH CV STRESS COMMENTS STAGE 1: NORMAL
BH CV STRESS DOSE REGADENOSON STAGE 1: 0.4
BH CV STRESS DURATION MIN STAGE 1: 0
BH CV STRESS DURATION SEC STAGE 1: 10
BH CV STRESS NUCLEAR ISOTOPE DOSE: 33.2 MCI
BH CV STRESS PROTOCOL 1: NORMAL
BH CV STRESS RECOVERY BP: NORMAL MMHG
BH CV STRESS RECOVERY HR: 115 BPM
BH CV STRESS STAGE 1: 1
BILIRUB SERPL-MCNC: 0.2 MG/DL (ref 0–1.2)
BUN SERPL-MCNC: 13 MG/DL (ref 6–20)
BUN SERPL-MCNC: 16 MG/DL (ref 6–20)
BUN/CREAT SERPL: 12 (ref 7–25)
BUN/CREAT SERPL: 13.3 (ref 7–25)
CALCIUM SPEC-SCNC: 8.8 MG/DL (ref 8.6–10.5)
CALCIUM SPEC-SCNC: 9.2 MG/DL (ref 8.6–10.5)
CANNABINOIDS SERPL QL: POSITIVE
CHLORIDE SERPL-SCNC: 102 MMOL/L (ref 98–107)
CHLORIDE SERPL-SCNC: 105 MMOL/L (ref 98–107)
CO2 SERPL-SCNC: 24 MMOL/L (ref 22–29)
CO2 SERPL-SCNC: 26.3 MMOL/L (ref 22–29)
COCAINE UR QL: NEGATIVE
CREAT SERPL-MCNC: 1.08 MG/DL (ref 0.76–1.27)
CREAT SERPL-MCNC: 1.2 MG/DL (ref 0.76–1.27)
D DIMER PPP FEU-MCNC: 0.31 MCGFEU/ML (ref 0–0.49)
DEPRECATED RDW RBC AUTO: 43.8 FL (ref 37–54)
DEPRECATED RDW RBC AUTO: 45.3 FL (ref 37–54)
EOSINOPHIL # BLD AUTO: 0.22 10*3/MM3 (ref 0–0.4)
EOSINOPHIL NFR BLD AUTO: 2 % (ref 0.3–6.2)
ERYTHROCYTE [DISTWIDTH] IN BLOOD BY AUTOMATED COUNT: 12.9 % (ref 12.3–15.4)
ERYTHROCYTE [DISTWIDTH] IN BLOOD BY AUTOMATED COUNT: 13.1 % (ref 12.3–15.4)
GFR SERPL CREATININE-BSD FRML MDRD: 66 ML/MIN/1.73
GFR SERPL CREATININE-BSD FRML MDRD: 75 ML/MIN/1.73
GLOBULIN UR ELPH-MCNC: 2.3 GM/DL
GLUCOSE SERPL-MCNC: 105 MG/DL (ref 65–99)
GLUCOSE SERPL-MCNC: 141 MG/DL (ref 65–99)
HCT VFR BLD AUTO: 41.4 % (ref 37.5–51)
HCT VFR BLD AUTO: 42.9 % (ref 37.5–51)
HGB BLD-MCNC: 14.3 G/DL (ref 13–17.7)
HGB BLD-MCNC: 14.7 G/DL (ref 13–17.7)
HOLD SPECIMEN: NORMAL
HOLD SPECIMEN: NORMAL
IMM GRANULOCYTES # BLD AUTO: 0.18 10*3/MM3 (ref 0–0.05)
IMM GRANULOCYTES NFR BLD AUTO: 1.7 % (ref 0–0.5)
LV EF NUC BP: 49 %
LYMPHOCYTES # BLD AUTO: 3.08 10*3/MM3 (ref 0.7–3.1)
LYMPHOCYTES NFR BLD AUTO: 28.3 % (ref 19.6–45.3)
MAXIMAL PREDICTED HEART RATE: 177 BPM
MCH RBC QN AUTO: 32 PG (ref 26.6–33)
MCH RBC QN AUTO: 32.2 PG (ref 26.6–33)
MCHC RBC AUTO-ENTMCNC: 34.3 G/DL (ref 31.5–35.7)
MCHC RBC AUTO-ENTMCNC: 34.5 G/DL (ref 31.5–35.7)
MCV RBC AUTO: 92.6 FL (ref 79–97)
MCV RBC AUTO: 93.9 FL (ref 79–97)
METHADONE UR QL SCN: NEGATIVE
MONOCYTES # BLD AUTO: 0.67 10*3/MM3 (ref 0.1–0.9)
MONOCYTES NFR BLD AUTO: 6.2 % (ref 5–12)
NEUTROPHILS NFR BLD AUTO: 6.64 10*3/MM3 (ref 1.7–7)
NEUTROPHILS NFR BLD AUTO: 61 % (ref 42.7–76)
NRBC BLD AUTO-RTO: 0 /100 WBC (ref 0–0.2)
OPIATES UR QL: NEGATIVE
OXYCODONE UR QL SCN: NEGATIVE
PERCENT MAX PREDICTED HR: 67.23 %
PLATELET # BLD AUTO: 254 10*3/MM3 (ref 140–450)
PLATELET # BLD AUTO: 268 10*3/MM3 (ref 140–450)
PMV BLD AUTO: 9.4 FL (ref 6–12)
PMV BLD AUTO: 9.6 FL (ref 6–12)
POTASSIUM SERPL-SCNC: 4.3 MMOL/L (ref 3.5–5.2)
POTASSIUM SERPL-SCNC: 4.5 MMOL/L (ref 3.5–5.2)
PROT SERPL-MCNC: 6.9 G/DL (ref 6–8.5)
QT INTERVAL: 314 MS
QT INTERVAL: 351 MS
RBC # BLD AUTO: 4.47 10*6/MM3 (ref 4.14–5.8)
RBC # BLD AUTO: 4.57 10*6/MM3 (ref 4.14–5.8)
SARS-COV-2 RNA PNL SPEC NAA+PROBE: NOT DETECTED
SODIUM SERPL-SCNC: 137 MMOL/L (ref 136–145)
SODIUM SERPL-SCNC: 138 MMOL/L (ref 136–145)
STRESS BASELINE BP: NORMAL MMHG
STRESS BASELINE HR: 95 BPM
STRESS PERCENT HR: 79 %
STRESS POST PEAK BP: NORMAL MMHG
STRESS POST PEAK HR: 119 BPM
STRESS TARGET HR: 150 BPM
T4 FREE SERPL-MCNC: 0.68 NG/DL (ref 0.93–1.7)
TROPONIN T SERPL-MCNC: <0.01 NG/ML (ref 0–0.03)
TSH SERPL DL<=0.05 MIU/L-ACNC: 24.3 UIU/ML (ref 0.27–4.2)
WBC NRBC COR # BLD: 10.88 10*3/MM3 (ref 3.4–10.8)
WBC NRBC COR # BLD: 9.3 10*3/MM3 (ref 3.4–10.8)
WHOLE BLOOD HOLD SPECIMEN: NORMAL
WHOLE BLOOD HOLD SPECIMEN: NORMAL

## 2021-11-21 PROCEDURE — 80053 COMPREHEN METABOLIC PANEL: CPT

## 2021-11-21 PROCEDURE — 85027 COMPLETE CBC AUTOMATED: CPT | Performed by: NURSE PRACTITIONER

## 2021-11-21 PROCEDURE — 93017 CV STRESS TEST TRACING ONLY: CPT

## 2021-11-21 PROCEDURE — 84439 ASSAY OF FREE THYROXINE: CPT | Performed by: NURSE PRACTITIONER

## 2021-11-21 PROCEDURE — 25010000002 REGADENOSON 0.4 MG/5ML SOLUTION: Performed by: INTERNAL MEDICINE

## 2021-11-21 PROCEDURE — 84443 ASSAY THYROID STIM HORMONE: CPT | Performed by: NURSE PRACTITIONER

## 2021-11-21 PROCEDURE — 80307 DRUG TEST PRSMV CHEM ANLYZR: CPT | Performed by: INTERNAL MEDICINE

## 2021-11-21 PROCEDURE — 71046 X-RAY EXAM CHEST 2 VIEWS: CPT

## 2021-11-21 PROCEDURE — 80048 BASIC METABOLIC PNL TOTAL CA: CPT | Performed by: NURSE PRACTITIONER

## 2021-11-21 PROCEDURE — 78452 HT MUSCLE IMAGE SPECT MULT: CPT

## 2021-11-21 PROCEDURE — A9500 TC99M SESTAMIBI: HCPCS | Performed by: INTERNAL MEDICINE

## 2021-11-21 PROCEDURE — 99254 IP/OBS CNSLTJ NEW/EST MOD 60: CPT | Performed by: INTERNAL MEDICINE

## 2021-11-21 PROCEDURE — 93010 ELECTROCARDIOGRAM REPORT: CPT | Performed by: INTERNAL MEDICINE

## 2021-11-21 PROCEDURE — 85025 COMPLETE CBC W/AUTO DIFF WBC: CPT

## 2021-11-21 PROCEDURE — 93018 CV STRESS TEST I&R ONLY: CPT | Performed by: INTERNAL MEDICINE

## 2021-11-21 PROCEDURE — 93016 CV STRESS TEST SUPVJ ONLY: CPT | Performed by: INTERNAL MEDICINE

## 2021-11-21 PROCEDURE — 36415 COLL VENOUS BLD VENIPUNCTURE: CPT | Performed by: NURSE PRACTITIONER

## 2021-11-21 PROCEDURE — 84484 ASSAY OF TROPONIN QUANT: CPT

## 2021-11-21 PROCEDURE — 0 TECHNETIUM SESTAMIBI: Performed by: INTERNAL MEDICINE

## 2021-11-21 PROCEDURE — 78452 HT MUSCLE IMAGE SPECT MULT: CPT | Performed by: INTERNAL MEDICINE

## 2021-11-21 PROCEDURE — 93005 ELECTROCARDIOGRAM TRACING: CPT | Performed by: INTERNAL MEDICINE

## 2021-11-21 PROCEDURE — 84484 ASSAY OF TROPONIN QUANT: CPT | Performed by: NURSE PRACTITIONER

## 2021-11-21 PROCEDURE — 93005 ELECTROCARDIOGRAM TRACING: CPT | Performed by: EMERGENCY MEDICINE

## 2021-11-21 PROCEDURE — 25010000002 ENOXAPARIN PER 10 MG: Performed by: INTERNAL MEDICINE

## 2021-11-21 PROCEDURE — G0378 HOSPITAL OBSERVATION PER HR: HCPCS

## 2021-11-21 PROCEDURE — 85379 FIBRIN DEGRADATION QUANT: CPT | Performed by: NURSE PRACTITIONER

## 2021-11-21 PROCEDURE — 87635 SARS-COV-2 COVID-19 AMP PRB: CPT | Performed by: NURSE PRACTITIONER

## 2021-11-21 PROCEDURE — 84484 ASSAY OF TROPONIN QUANT: CPT | Performed by: INTERNAL MEDICINE

## 2021-11-21 PROCEDURE — 25010000002 THIAMINE PER 100 MG: Performed by: NURSE PRACTITIONER

## 2021-11-21 PROCEDURE — 93005 ELECTROCARDIOGRAM TRACING: CPT

## 2021-11-21 RX ORDER — LORAZEPAM 1 MG/1
1 TABLET ORAL
Status: DISCONTINUED | OUTPATIENT
Start: 2021-11-21 | End: 2021-11-25 | Stop reason: HOSPADM

## 2021-11-21 RX ORDER — SODIUM CHLORIDE 0.9 % (FLUSH) 0.9 %
10 SYRINGE (ML) INJECTION AS NEEDED
Status: DISCONTINUED | OUTPATIENT
Start: 2021-11-21 | End: 2021-11-25 | Stop reason: HOSPADM

## 2021-11-21 RX ORDER — SODIUM CHLORIDE 0.9 % (FLUSH) 0.9 %
10 SYRINGE (ML) INJECTION EVERY 12 HOURS SCHEDULED
Status: DISCONTINUED | OUTPATIENT
Start: 2021-11-21 | End: 2021-11-25 | Stop reason: HOSPADM

## 2021-11-21 RX ORDER — LEVOTHYROXINE SODIUM 112 UG/1
112 TABLET ORAL
Status: DISCONTINUED | OUTPATIENT
Start: 2021-11-22 | End: 2021-11-25 | Stop reason: HOSPADM

## 2021-11-21 RX ORDER — LORAZEPAM 2 MG/ML
2 INJECTION INTRAMUSCULAR
Status: DISCONTINUED | OUTPATIENT
Start: 2021-11-21 | End: 2021-11-25 | Stop reason: HOSPADM

## 2021-11-21 RX ORDER — CHLORAL HYDRATE 500 MG
1 CAPSULE ORAL
COMMUNITY

## 2021-11-21 RX ORDER — LORAZEPAM 1 MG/1
2 TABLET ORAL
Status: DISCONTINUED | OUTPATIENT
Start: 2021-11-21 | End: 2021-11-25 | Stop reason: HOSPADM

## 2021-11-21 RX ORDER — ACETAMINOPHEN 325 MG/1
650 TABLET ORAL EVERY 4 HOURS PRN
Status: DISCONTINUED | OUTPATIENT
Start: 2021-11-21 | End: 2021-11-25 | Stop reason: HOSPADM

## 2021-11-21 RX ORDER — LABETALOL HYDROCHLORIDE 5 MG/ML
10 INJECTION, SOLUTION INTRAVENOUS EVERY 6 HOURS PRN
Status: DISCONTINUED | OUTPATIENT
Start: 2021-11-21 | End: 2021-11-25 | Stop reason: HOSPADM

## 2021-11-21 RX ORDER — ONDANSETRON 2 MG/ML
4 INJECTION INTRAMUSCULAR; INTRAVENOUS EVERY 6 HOURS PRN
Status: DISCONTINUED | OUTPATIENT
Start: 2021-11-21 | End: 2021-11-25 | Stop reason: HOSPADM

## 2021-11-21 RX ORDER — NICOTINE 21 MG/24HR
1 PATCH, TRANSDERMAL 24 HOURS TRANSDERMAL
Status: DISCONTINUED | OUTPATIENT
Start: 2021-11-21 | End: 2021-11-25 | Stop reason: HOSPADM

## 2021-11-21 RX ORDER — ACETAMINOPHEN 650 MG/1
650 SUPPOSITORY RECTAL EVERY 4 HOURS PRN
Status: DISCONTINUED | OUTPATIENT
Start: 2021-11-21 | End: 2021-11-25 | Stop reason: HOSPADM

## 2021-11-21 RX ORDER — NICOTINE 21 MG/24HR
1 PATCH, TRANSDERMAL 24 HOURS TRANSDERMAL
Status: DISCONTINUED | OUTPATIENT
Start: 2021-11-21 | End: 2021-11-21

## 2021-11-21 RX ORDER — ACETAMINOPHEN 160 MG/5ML
650 SOLUTION ORAL EVERY 4 HOURS PRN
Status: DISCONTINUED | OUTPATIENT
Start: 2021-11-21 | End: 2021-11-25 | Stop reason: HOSPADM

## 2021-11-21 RX ORDER — ONDANSETRON 4 MG/1
4 TABLET, FILM COATED ORAL EVERY 6 HOURS PRN
Status: DISCONTINUED | OUTPATIENT
Start: 2021-11-21 | End: 2021-11-25 | Stop reason: HOSPADM

## 2021-11-21 RX ORDER — UBIDECARENONE 100 MG
100 CAPSULE ORAL DAILY
COMMUNITY

## 2021-11-21 RX ORDER — LISINOPRIL 20 MG/1
20 TABLET ORAL
Status: DISCONTINUED | OUTPATIENT
Start: 2021-11-22 | End: 2021-11-22

## 2021-11-21 RX ORDER — LORAZEPAM 2 MG/ML
1 INJECTION INTRAMUSCULAR
Status: DISCONTINUED | OUTPATIENT
Start: 2021-11-21 | End: 2021-11-25 | Stop reason: HOSPADM

## 2021-11-21 RX ORDER — LISINOPRIL 40 MG/1
40 TABLET ORAL
Status: DISCONTINUED | OUTPATIENT
Start: 2021-11-21 | End: 2021-11-21

## 2021-11-21 RX ORDER — CALCIUM CARBONATE 200(500)MG
2 TABLET,CHEWABLE ORAL 2 TIMES DAILY PRN
Status: DISCONTINUED | OUTPATIENT
Start: 2021-11-21 | End: 2021-11-25 | Stop reason: HOSPADM

## 2021-11-21 RX ORDER — LISINOPRIL 20 MG/1
20 TABLET ORAL
Status: DISCONTINUED | OUTPATIENT
Start: 2021-11-21 | End: 2021-11-22 | Stop reason: SDUPTHER

## 2021-11-21 RX ORDER — DIPHENOXYLATE HYDROCHLORIDE AND ATROPINE SULFATE 2.5; .025 MG/1; MG/1
1 TABLET ORAL DAILY
Status: COMPLETED | OUTPATIENT
Start: 2021-11-22 | End: 2021-11-24

## 2021-11-21 RX ORDER — NITROGLYCERIN 0.4 MG/1
0.4 TABLET SUBLINGUAL
Status: DISCONTINUED | OUTPATIENT
Start: 2021-11-21 | End: 2021-11-25 | Stop reason: HOSPADM

## 2021-11-21 RX ORDER — FOLIC ACID 1 MG/1
1 TABLET ORAL DAILY
Status: COMPLETED | OUTPATIENT
Start: 2021-11-22 | End: 2021-11-24

## 2021-11-21 RX ADMIN — LABETALOL HYDROCHLORIDE 10 MG: 5 INJECTION, SOLUTION INTRAVENOUS at 16:28

## 2021-11-21 RX ADMIN — NICOTINE 1 PATCH: 21 PATCH, EXTENDED RELEASE TRANSDERMAL at 16:16

## 2021-11-21 RX ADMIN — NITROGLYCERIN 0.4 MG: 0.4 TABLET SUBLINGUAL at 08:18

## 2021-11-21 RX ADMIN — THIAMINE HYDROCHLORIDE 100 MG: 100 INJECTION, SOLUTION INTRAMUSCULAR; INTRAVENOUS at 08:25

## 2021-11-21 RX ADMIN — ACETAMINOPHEN 650 MG: 325 TABLET, FILM COATED ORAL at 08:31

## 2021-11-21 RX ADMIN — LISINOPRIL 20 MG: 20 TABLET ORAL at 14:57

## 2021-11-21 RX ADMIN — ENOXAPARIN SODIUM 40 MG: 40 INJECTION SUBCUTANEOUS at 14:58

## 2021-11-21 RX ADMIN — NITROGLYCERIN 0.4 MG: 0.4 TABLET SUBLINGUAL at 08:08

## 2021-11-21 RX ADMIN — SODIUM CHLORIDE 1000 ML: 9 INJECTION, SOLUTION INTRAVENOUS at 01:19

## 2021-11-21 RX ADMIN — SODIUM CHLORIDE, PRESERVATIVE FREE 10 ML: 5 INJECTION INTRAVENOUS at 08:26

## 2021-11-21 RX ADMIN — SODIUM CHLORIDE, PRESERVATIVE FREE 10 ML: 5 INJECTION INTRAVENOUS at 20:28

## 2021-11-21 RX ADMIN — TECHNETIUM TC 99M SESTAMIBI 1 DOSE: 1 INJECTION INTRAVENOUS at 10:39

## 2021-11-21 RX ADMIN — TECHNETIUM TC 99M SESTAMIBI 1 DOSE: 1 INJECTION INTRAVENOUS at 13:30

## 2021-11-21 RX ADMIN — REGADENOSON 0.4 MG: 0.08 INJECTION, SOLUTION INTRAVENOUS at 13:30

## 2021-11-22 ENCOUNTER — APPOINTMENT (OUTPATIENT)
Dept: CARDIOLOGY | Facility: HOSPITAL | Age: 46
End: 2021-11-22

## 2021-11-22 LAB
ANION GAP SERPL CALCULATED.3IONS-SCNC: 9.3 MMOL/L (ref 5–15)
AORTIC DIMENSIONLESS INDEX: 0.8 (DI)
BH CV ECHO MEAS - AO MAX PG (FULL): 1.5 MMHG
BH CV ECHO MEAS - AO MAX PG: 4.4 MMHG
BH CV ECHO MEAS - AO MEAN PG (FULL): 1.2 MMHG
BH CV ECHO MEAS - AO MEAN PG: 2.8 MMHG
BH CV ECHO MEAS - AO V2 MAX: 104.4 CM/SEC
BH CV ECHO MEAS - AO V2 MEAN: 80 CM/SEC
BH CV ECHO MEAS - AO V2 VTI: 18.8 CM
BH CV ECHO MEAS - ASC AORTA: 3.6 CM
BH CV ECHO MEAS - AVA(I,A): 2.6 CM^2
BH CV ECHO MEAS - AVA(I,D): 2.6 CM^2
BH CV ECHO MEAS - AVA(V,A): 2.5 CM^2
BH CV ECHO MEAS - AVA(V,D): 2.5 CM^2
BH CV ECHO MEAS - BSA(HAYCOCK): 2.3 M^2
BH CV ECHO MEAS - BSA: 2.2 M^2
BH CV ECHO MEAS - BZI_BMI: 30.5 KILOGRAMS/M^2
BH CV ECHO MEAS - BZI_METRIC_HEIGHT: 182.9 CM
BH CV ECHO MEAS - BZI_METRIC_WEIGHT: 102.1 KG
BH CV ECHO MEAS - EDV(CUBED): 126.5 ML
BH CV ECHO MEAS - EDV(MOD-SP2): 99 ML
BH CV ECHO MEAS - EDV(MOD-SP4): 156 ML
BH CV ECHO MEAS - EDV(TEICH): 119.4 ML
BH CV ECHO MEAS - EF(CUBED): 66.2 %
BH CV ECHO MEAS - EF(MOD-BP): 55.7 %
BH CV ECHO MEAS - EF(MOD-SP2): 52.5 %
BH CV ECHO MEAS - EF(MOD-SP4): 59.6 %
BH CV ECHO MEAS - EF(TEICH): 57.5 %
BH CV ECHO MEAS - ESV(CUBED): 42.7 ML
BH CV ECHO MEAS - ESV(MOD-SP2): 47 ML
BH CV ECHO MEAS - ESV(MOD-SP4): 63 ML
BH CV ECHO MEAS - ESV(TEICH): 50.7 ML
BH CV ECHO MEAS - FS: 30.4 %
BH CV ECHO MEAS - IVS/LVPW: 1.1
BH CV ECHO MEAS - IVSD: 1.3 CM
BH CV ECHO MEAS - LAT PEAK E' VEL: 4.9 CM/SEC
BH CV ECHO MEAS - LV DIASTOLIC VOL/BSA (35-75): 69.7 ML/M^2
BH CV ECHO MEAS - LV MASS(C)D: 240.1 GRAMS
BH CV ECHO MEAS - LV MASS(C)DI: 107.2 GRAMS/M^2
BH CV ECHO MEAS - LV MAX PG: 2.8 MMHG
BH CV ECHO MEAS - LV MEAN PG: 1.6 MMHG
BH CV ECHO MEAS - LV SYSTOLIC VOL/BSA (12-30): 28.1 ML/M^2
BH CV ECHO MEAS - LV V1 MAX: 84.4 CM/SEC
BH CV ECHO MEAS - LV V1 MEAN: 58.8 CM/SEC
BH CV ECHO MEAS - LV V1 VTI: 16 CM
BH CV ECHO MEAS - LVIDD: 5 CM
BH CV ECHO MEAS - LVIDS: 3.5 CM
BH CV ECHO MEAS - LVLD AP2: 8.3 CM
BH CV ECHO MEAS - LVLD AP4: 8.7 CM
BH CV ECHO MEAS - LVLS AP2: 8 CM
BH CV ECHO MEAS - LVLS AP4: 7.4 CM
BH CV ECHO MEAS - LVOT AREA (M): 3.1 CM^2
BH CV ECHO MEAS - LVOT AREA: 3.1 CM^2
BH CV ECHO MEAS - LVOT DIAM: 2 CM
BH CV ECHO MEAS - LVPWD: 1.1 CM
BH CV ECHO MEAS - MED PEAK E' VEL: 5.2 CM/SEC
BH CV ECHO MEAS - MR MAX PG: 142.7 MMHG
BH CV ECHO MEAS - MR MAX VEL: 597.2 CM/SEC
BH CV ECHO MEAS - MV A DUR: 0.13 SEC
BH CV ECHO MEAS - MV A MAX VEL: 93.6 CM/SEC
BH CV ECHO MEAS - MV DEC SLOPE: 475.8 CM/SEC^2
BH CV ECHO MEAS - MV DEC TIME: 144 SEC
BH CV ECHO MEAS - MV E MAX VEL: 58.7 CM/SEC
BH CV ECHO MEAS - MV E/A: 0.63
BH CV ECHO MEAS - MV MAX PG: 4.1 MMHG
BH CV ECHO MEAS - MV MEAN PG: 1.8 MMHG
BH CV ECHO MEAS - MV P1/2T MAX VEL: 87.6 CM/SEC
BH CV ECHO MEAS - MV P1/2T: 54 MSEC
BH CV ECHO MEAS - MV V2 MAX: 100.8 CM/SEC
BH CV ECHO MEAS - MV V2 MEAN: 62.2 CM/SEC
BH CV ECHO MEAS - MV V2 VTI: 24.4 CM
BH CV ECHO MEAS - MVA P1/2T LCG: 2.5 CM^2
BH CV ECHO MEAS - MVA(P1/2T): 4.1 CM^2
BH CV ECHO MEAS - MVA(VTI): 2 CM^2
BH CV ECHO MEAS - PA ACC TIME: 0.14 SEC
BH CV ECHO MEAS - PA MAX PG (FULL): 0.46 MMHG
BH CV ECHO MEAS - PA MAX PG: 1.7 MMHG
BH CV ECHO MEAS - PA PR(ACCEL): 17.2 MMHG
BH CV ECHO MEAS - PA V2 MAX: 64.5 CM/SEC
BH CV ECHO MEAS - PVA(V,A): 2.4 CM^2
BH CV ECHO MEAS - PVA(V,D): 2.4 CM^2
BH CV ECHO MEAS - QP/QS: 0.54
BH CV ECHO MEAS - RAP SYSTOLE: 3 MMHG
BH CV ECHO MEAS - RV MAX PG: 1.2 MMHG
BH CV ECHO MEAS - RV MEAN PG: 0.54 MMHG
BH CV ECHO MEAS - RV V1 MAX: 54.8 CM/SEC
BH CV ECHO MEAS - RV V1 MEAN: 33.3 CM/SEC
BH CV ECHO MEAS - RV V1 VTI: 9.2 CM
BH CV ECHO MEAS - RVOT AREA: 2.9 CM^2
BH CV ECHO MEAS - RVOT DIAM: 1.9 CM
BH CV ECHO MEAS - SI(CUBED): 37.4 ML/M^2
BH CV ECHO MEAS - SI(LVOT): 22.1 ML/M^2
BH CV ECHO MEAS - SI(MOD-SP2): 23.2 ML/M^2
BH CV ECHO MEAS - SI(MOD-SP4): 41.5 ML/M^2
BH CV ECHO MEAS - SI(TEICH): 30.7 ML/M^2
BH CV ECHO MEAS - SV(CUBED): 83.8 ML
BH CV ECHO MEAS - SV(LVOT): 49.4 ML
BH CV ECHO MEAS - SV(MOD-SP2): 52 ML
BH CV ECHO MEAS - SV(MOD-SP4): 93 ML
BH CV ECHO MEAS - SV(RVOT): 26.5 ML
BH CV ECHO MEAS - SV(TEICH): 68.7 ML
BH CV ECHO MEAS - TAPSE (>1.6): 2.4 CM
BH CV ECHO MEASUREMENTS AVERAGE E/E' RATIO: 11.62
BH CV XLRA - RV BASE: 2.5 CM
BH CV XLRA - RV LENGTH: 6.3 CM
BH CV XLRA - RV MID: 2.3 CM
BH CV XLRA - TDI S': 9.6 CM/SEC
BUN SERPL-MCNC: 15 MG/DL (ref 6–20)
BUN/CREAT SERPL: 13 (ref 7–25)
CALCIUM SPEC-SCNC: 9.1 MG/DL (ref 8.6–10.5)
CHLORIDE SERPL-SCNC: 103 MMOL/L (ref 98–107)
CO2 SERPL-SCNC: 26.7 MMOL/L (ref 22–29)
CREAT SERPL-MCNC: 1.15 MG/DL (ref 0.76–1.27)
DEPRECATED RDW RBC AUTO: 43.3 FL (ref 37–54)
ERYTHROCYTE [DISTWIDTH] IN BLOOD BY AUTOMATED COUNT: 12.7 % (ref 12.3–15.4)
GFR SERPL CREATININE-BSD FRML MDRD: 69 ML/MIN/1.73
GLUCOSE SERPL-MCNC: 84 MG/DL (ref 65–99)
HCT VFR BLD AUTO: 43.4 % (ref 37.5–51)
HGB BLD-MCNC: 15 G/DL (ref 13–17.7)
LEFT ATRIUM VOLUME INDEX: 22 ML/M2
LV EF 2D ECHO EST: 56 %
MAXIMAL PREDICTED HEART RATE: 177 BPM
MCH RBC QN AUTO: 32.1 PG (ref 26.6–33)
MCHC RBC AUTO-ENTMCNC: 34.6 G/DL (ref 31.5–35.7)
MCV RBC AUTO: 92.7 FL (ref 79–97)
PLATELET # BLD AUTO: 245 10*3/MM3 (ref 140–450)
PMV BLD AUTO: 9.5 FL (ref 6–12)
POTASSIUM SERPL-SCNC: 4.6 MMOL/L (ref 3.5–5.2)
RBC # BLD AUTO: 4.68 10*6/MM3 (ref 4.14–5.8)
SODIUM SERPL-SCNC: 139 MMOL/L (ref 136–145)
STRESS TARGET HR: 150 BPM
WBC NRBC COR # BLD: 8.12 10*3/MM3 (ref 3.4–10.8)

## 2021-11-22 PROCEDURE — G0378 HOSPITAL OBSERVATION PER HR: HCPCS

## 2021-11-22 PROCEDURE — 25010000002 PERFLUTREN (DEFINITY) 8.476 MG IN SODIUM CHLORIDE (PF) 0.9 % 10 ML INJECTION: Performed by: INTERNAL MEDICINE

## 2021-11-22 PROCEDURE — 36415 COLL VENOUS BLD VENIPUNCTURE: CPT | Performed by: NURSE PRACTITIONER

## 2021-11-22 PROCEDURE — 93306 TTE W/DOPPLER COMPLETE: CPT | Performed by: INTERNAL MEDICINE

## 2021-11-22 PROCEDURE — 90791 PSYCH DIAGNOSTIC EVALUATION: CPT

## 2021-11-22 PROCEDURE — 25010000002 ENOXAPARIN PER 10 MG: Performed by: INTERNAL MEDICINE

## 2021-11-22 PROCEDURE — 99232 SBSQ HOSP IP/OBS MODERATE 35: CPT | Performed by: NURSE PRACTITIONER

## 2021-11-22 PROCEDURE — 80048 BASIC METABOLIC PNL TOTAL CA: CPT | Performed by: NURSE PRACTITIONER

## 2021-11-22 PROCEDURE — 25010000002 HYDRALAZINE PER 20 MG: Performed by: NURSE PRACTITIONER

## 2021-11-22 PROCEDURE — 93306 TTE W/DOPPLER COMPLETE: CPT

## 2021-11-22 PROCEDURE — 85027 COMPLETE CBC AUTOMATED: CPT | Performed by: NURSE PRACTITIONER

## 2021-11-22 RX ORDER — HYDRALAZINE HYDROCHLORIDE 20 MG/ML
10 INJECTION INTRAMUSCULAR; INTRAVENOUS EVERY 6 HOURS PRN
Status: DISCONTINUED | OUTPATIENT
Start: 2021-11-22 | End: 2021-11-25 | Stop reason: HOSPADM

## 2021-11-22 RX ORDER — LISINOPRIL 20 MG/1
20 TABLET ORAL EVERY 12 HOURS SCHEDULED
Status: DISCONTINUED | OUTPATIENT
Start: 2021-11-23 | End: 2021-11-25 | Stop reason: HOSPADM

## 2021-11-22 RX ORDER — CARVEDILOL 3.12 MG/1
3.12 TABLET ORAL 2 TIMES DAILY WITH MEALS
Status: DISCONTINUED | OUTPATIENT
Start: 2021-11-22 | End: 2021-11-23

## 2021-11-22 RX ORDER — LISINOPRIL 20 MG/1
20 TABLET ORAL ONCE
Status: COMPLETED | OUTPATIENT
Start: 2021-11-22 | End: 2021-11-22

## 2021-11-22 RX ADMIN — NITROGLYCERIN 0.5 INCH: 20 OINTMENT TOPICAL at 17:15

## 2021-11-22 RX ADMIN — LISINOPRIL 20 MG: 20 TABLET ORAL at 16:19

## 2021-11-22 RX ADMIN — NICOTINE 1 PATCH: 21 PATCH, EXTENDED RELEASE TRANSDERMAL at 08:10

## 2021-11-22 RX ADMIN — LABETALOL HYDROCHLORIDE 10 MG: 5 INJECTION, SOLUTION INTRAVENOUS at 17:15

## 2021-11-22 RX ADMIN — ACETAMINOPHEN 650 MG: 325 TABLET, FILM COATED ORAL at 07:55

## 2021-11-22 RX ADMIN — Medication 1 TABLET: at 09:18

## 2021-11-22 RX ADMIN — NITROGLYCERIN 0.5 INCH: 20 OINTMENT TOPICAL at 11:48

## 2021-11-22 RX ADMIN — FOLIC ACID 1 MG: 1 TABLET ORAL at 09:18

## 2021-11-22 RX ADMIN — HYDRALAZINE HYDROCHLORIDE 10 MG: 20 INJECTION INTRAMUSCULAR; INTRAVENOUS at 14:23

## 2021-11-22 RX ADMIN — NITROGLYCERIN 0.4 MG: 0.4 TABLET SUBLINGUAL at 08:02

## 2021-11-22 RX ADMIN — SODIUM CHLORIDE, PRESERVATIVE FREE 10 ML: 5 INJECTION INTRAVENOUS at 20:10

## 2021-11-22 RX ADMIN — SODIUM CHLORIDE, PRESERVATIVE FREE 10 ML: 5 INJECTION INTRAVENOUS at 08:14

## 2021-11-22 RX ADMIN — Medication 100 MG: at 09:18

## 2021-11-22 RX ADMIN — ACETAMINOPHEN 650 MG: 325 TABLET, FILM COATED ORAL at 17:21

## 2021-11-22 RX ADMIN — PERFLUTREN 3 ML: 6.52 INJECTION, SUSPENSION INTRAVENOUS at 13:07

## 2021-11-22 RX ADMIN — NITROGLYCERIN 0.4 MG: 0.4 TABLET SUBLINGUAL at 07:55

## 2021-11-22 RX ADMIN — NITROGLYCERIN 0.4 MG: 0.4 TABLET SUBLINGUAL at 08:07

## 2021-11-22 RX ADMIN — CARVEDILOL 3.12 MG: 3.12 TABLET, FILM COATED ORAL at 11:11

## 2021-11-22 RX ADMIN — ENOXAPARIN SODIUM 40 MG: 40 INJECTION SUBCUTANEOUS at 07:57

## 2021-11-22 RX ADMIN — LEVOTHYROXINE SODIUM 112 MCG: 0.11 TABLET ORAL at 05:36

## 2021-11-22 RX ADMIN — LISINOPRIL 20 MG: 20 TABLET ORAL at 08:11

## 2021-11-23 PROCEDURE — G0378 HOSPITAL OBSERVATION PER HR: HCPCS

## 2021-11-23 PROCEDURE — 25010000002 ENOXAPARIN PER 10 MG: Performed by: INTERNAL MEDICINE

## 2021-11-23 PROCEDURE — 99232 SBSQ HOSP IP/OBS MODERATE 35: CPT | Performed by: INTERNAL MEDICINE

## 2021-11-23 PROCEDURE — 25010000002 LORAZEPAM PER 2 MG: Performed by: NURSE PRACTITIONER

## 2021-11-23 RX ORDER — HYDRALAZINE HYDROCHLORIDE 25 MG/1
25 TABLET, FILM COATED ORAL EVERY 8 HOURS SCHEDULED
Status: DISCONTINUED | OUTPATIENT
Start: 2021-11-23 | End: 2021-11-24

## 2021-11-23 RX ORDER — CARVEDILOL 12.5 MG/1
12.5 TABLET ORAL 2 TIMES DAILY WITH MEALS
Status: DISCONTINUED | OUTPATIENT
Start: 2021-11-23 | End: 2021-11-25

## 2021-11-23 RX ORDER — CARVEDILOL 6.25 MG/1
6.25 TABLET ORAL 2 TIMES DAILY WITH MEALS
Status: DISCONTINUED | OUTPATIENT
Start: 2021-11-23 | End: 2021-11-23

## 2021-11-23 RX ADMIN — SODIUM CHLORIDE, PRESERVATIVE FREE 10 ML: 5 INJECTION INTRAVENOUS at 21:45

## 2021-11-23 RX ADMIN — NITROGLYCERIN 0.5 INCH: 20 OINTMENT TOPICAL at 02:02

## 2021-11-23 RX ADMIN — LORAZEPAM 1 MG: 2 INJECTION INTRAMUSCULAR; INTRAVENOUS at 06:34

## 2021-11-23 RX ADMIN — FOLIC ACID 1 MG: 1 TABLET ORAL at 10:44

## 2021-11-23 RX ADMIN — HYDRALAZINE HYDROCHLORIDE 25 MG: 25 TABLET, FILM COATED ORAL at 21:45

## 2021-11-23 RX ADMIN — CARVEDILOL 12.5 MG: 12.5 TABLET, FILM COATED ORAL at 17:53

## 2021-11-23 RX ADMIN — ACETAMINOPHEN 650 MG: 325 TABLET, FILM COATED ORAL at 03:13

## 2021-11-23 RX ADMIN — HYDRALAZINE HYDROCHLORIDE 25 MG: 25 TABLET, FILM COATED ORAL at 10:44

## 2021-11-23 RX ADMIN — SODIUM CHLORIDE, PRESERVATIVE FREE 10 ML: 5 INJECTION INTRAVENOUS at 08:38

## 2021-11-23 RX ADMIN — LISINOPRIL 20 MG: 20 TABLET ORAL at 08:36

## 2021-11-23 RX ADMIN — LEVOTHYROXINE SODIUM 112 MCG: 0.11 TABLET ORAL at 06:17

## 2021-11-23 RX ADMIN — Medication 100 MG: at 10:44

## 2021-11-23 RX ADMIN — NICOTINE 1 PATCH: 21 PATCH, EXTENDED RELEASE TRANSDERMAL at 08:49

## 2021-11-23 RX ADMIN — LORAZEPAM 1 MG: 2 INJECTION INTRAMUSCULAR; INTRAVENOUS at 08:36

## 2021-11-23 RX ADMIN — NITROGLYCERIN 0.4 MG: 0.4 TABLET SUBLINGUAL at 03:13

## 2021-11-23 RX ADMIN — LORAZEPAM 1 MG: 2 INJECTION INTRAMUSCULAR; INTRAVENOUS at 13:35

## 2021-11-23 RX ADMIN — HYDRALAZINE HYDROCHLORIDE 25 MG: 25 TABLET, FILM COATED ORAL at 13:35

## 2021-11-23 RX ADMIN — Medication 1 TABLET: at 08:38

## 2021-11-23 RX ADMIN — CARVEDILOL 6.25 MG: 3.12 TABLET, FILM COATED ORAL at 08:36

## 2021-11-23 RX ADMIN — LISINOPRIL 20 MG: 20 TABLET ORAL at 21:45

## 2021-11-23 RX ADMIN — ENOXAPARIN SODIUM 40 MG: 40 INJECTION SUBCUTANEOUS at 10:45

## 2021-11-23 RX ADMIN — LORAZEPAM 1 MG: 2 INJECTION INTRAMUSCULAR; INTRAVENOUS at 16:03

## 2021-11-24 LAB
ALBUMIN SERPL-MCNC: 4.5 G/DL (ref 3.5–5.2)
ANION GAP SERPL CALCULATED.3IONS-SCNC: 12.2 MMOL/L (ref 5–15)
BASOPHILS # BLD AUTO: 0.09 10*3/MM3 (ref 0–0.2)
BASOPHILS NFR BLD AUTO: 0.8 % (ref 0–1.5)
BUN SERPL-MCNC: 15 MG/DL (ref 6–20)
BUN/CREAT SERPL: 13.4 (ref 7–25)
CALCIUM SPEC-SCNC: 9.2 MG/DL (ref 8.6–10.5)
CHLORIDE SERPL-SCNC: 101 MMOL/L (ref 98–107)
CK SERPL-CCNC: 322 U/L (ref 20–200)
CO2 SERPL-SCNC: 21.8 MMOL/L (ref 22–29)
CREAT SERPL-MCNC: 1.12 MG/DL (ref 0.76–1.27)
DEPRECATED RDW RBC AUTO: 42.5 FL (ref 37–54)
EOSINOPHIL # BLD AUTO: 0.19 10*3/MM3 (ref 0–0.4)
EOSINOPHIL NFR BLD AUTO: 1.7 % (ref 0.3–6.2)
ERYTHROCYTE [DISTWIDTH] IN BLOOD BY AUTOMATED COUNT: 12.8 % (ref 12.3–15.4)
GFR SERPL CREATININE-BSD FRML MDRD: 71 ML/MIN/1.73
GLUCOSE SERPL-MCNC: 187 MG/DL (ref 65–99)
HCT VFR BLD AUTO: 46.4 % (ref 37.5–51)
HGB BLD-MCNC: 16.3 G/DL (ref 13–17.7)
IMM GRANULOCYTES # BLD AUTO: 0.16 10*3/MM3 (ref 0–0.05)
IMM GRANULOCYTES NFR BLD AUTO: 1.4 % (ref 0–0.5)
LYMPHOCYTES # BLD AUTO: 1.15 10*3/MM3 (ref 0.7–3.1)
LYMPHOCYTES NFR BLD AUTO: 10.1 % (ref 19.6–45.3)
MAGNESIUM SERPL-MCNC: 2.1 MG/DL (ref 1.6–2.6)
MAGNESIUM SERPL-MCNC: 2.2 MG/DL (ref 1.6–2.6)
MCH RBC QN AUTO: 32.2 PG (ref 26.6–33)
MCHC RBC AUTO-ENTMCNC: 35.1 G/DL (ref 31.5–35.7)
MCV RBC AUTO: 91.7 FL (ref 79–97)
MONOCYTES # BLD AUTO: 0.75 10*3/MM3 (ref 0.1–0.9)
MONOCYTES NFR BLD AUTO: 6.6 % (ref 5–12)
NEUTROPHILS NFR BLD AUTO: 79.4 % (ref 42.7–76)
NEUTROPHILS NFR BLD AUTO: 9.01 10*3/MM3 (ref 1.7–7)
NRBC BLD AUTO-RTO: 0 /100 WBC (ref 0–0.2)
PHOSPHATE SERPL-MCNC: 3.2 MG/DL (ref 2.5–4.5)
PLATELET # BLD AUTO: 279 10*3/MM3 (ref 140–450)
PMV BLD AUTO: 9.7 FL (ref 6–12)
POTASSIUM SERPL-SCNC: 4.2 MMOL/L (ref 3.5–5.2)
POTASSIUM SERPL-SCNC: 4.3 MMOL/L (ref 3.5–5.2)
RBC # BLD AUTO: 5.06 10*6/MM3 (ref 4.14–5.8)
SODIUM SERPL-SCNC: 135 MMOL/L (ref 136–145)
TROPONIN T SERPL-MCNC: <0.01 NG/ML (ref 0–0.03)
URATE SERPL-MCNC: 5.9 MG/DL (ref 3.4–7)
WBC NRBC COR # BLD: 11.35 10*3/MM3 (ref 3.4–10.8)

## 2021-11-24 PROCEDURE — 84132 ASSAY OF SERUM POTASSIUM: CPT | Performed by: INTERNAL MEDICINE

## 2021-11-24 PROCEDURE — 84550 ASSAY OF BLOOD/URIC ACID: CPT | Performed by: HOSPITALIST

## 2021-11-24 PROCEDURE — 99232 SBSQ HOSP IP/OBS MODERATE 35: CPT | Performed by: NURSE PRACTITIONER

## 2021-11-24 PROCEDURE — 93005 ELECTROCARDIOGRAM TRACING: CPT | Performed by: INTERNAL MEDICINE

## 2021-11-24 PROCEDURE — 85025 COMPLETE CBC W/AUTO DIFF WBC: CPT | Performed by: HOSPITALIST

## 2021-11-24 PROCEDURE — 36415 COLL VENOUS BLD VENIPUNCTURE: CPT | Performed by: HOSPITALIST

## 2021-11-24 PROCEDURE — 83735 ASSAY OF MAGNESIUM: CPT | Performed by: HOSPITALIST

## 2021-11-24 PROCEDURE — 25010000002 ENOXAPARIN PER 10 MG: Performed by: INTERNAL MEDICINE

## 2021-11-24 PROCEDURE — 84484 ASSAY OF TROPONIN QUANT: CPT | Performed by: INTERNAL MEDICINE

## 2021-11-24 PROCEDURE — 82550 ASSAY OF CK (CPK): CPT | Performed by: INTERNAL MEDICINE

## 2021-11-24 PROCEDURE — 93010 ELECTROCARDIOGRAM REPORT: CPT | Performed by: INTERNAL MEDICINE

## 2021-11-24 PROCEDURE — 80069 RENAL FUNCTION PANEL: CPT | Performed by: HOSPITALIST

## 2021-11-24 PROCEDURE — 25010000002 HYDRALAZINE PER 20 MG: Performed by: NURSE PRACTITIONER

## 2021-11-24 PROCEDURE — 83735 ASSAY OF MAGNESIUM: CPT | Performed by: INTERNAL MEDICINE

## 2021-11-24 PROCEDURE — 25010000002 LORAZEPAM PER 2 MG: Performed by: NURSE PRACTITIONER

## 2021-11-24 RX ORDER — HYDRALAZINE HYDROCHLORIDE 50 MG/1
50 TABLET, FILM COATED ORAL EVERY 8 HOURS SCHEDULED
Status: DISCONTINUED | OUTPATIENT
Start: 2021-11-24 | End: 2021-11-25 | Stop reason: HOSPADM

## 2021-11-24 RX ADMIN — LORAZEPAM 2 MG: 2 INJECTION INTRAMUSCULAR; INTRAVENOUS at 01:34

## 2021-11-24 RX ADMIN — CARVEDILOL 12.5 MG: 12.5 TABLET, FILM COATED ORAL at 17:31

## 2021-11-24 RX ADMIN — SODIUM CHLORIDE, PRESERVATIVE FREE 10 ML: 5 INJECTION INTRAVENOUS at 09:21

## 2021-11-24 RX ADMIN — FOLIC ACID 1 MG: 1 TABLET ORAL at 09:20

## 2021-11-24 RX ADMIN — HYDRALAZINE HYDROCHLORIDE 50 MG: 50 TABLET, FILM COATED ORAL at 20:23

## 2021-11-24 RX ADMIN — CARVEDILOL 12.5 MG: 12.5 TABLET, FILM COATED ORAL at 09:20

## 2021-11-24 RX ADMIN — Medication 100 MG: at 09:20

## 2021-11-24 RX ADMIN — LISINOPRIL 20 MG: 20 TABLET ORAL at 09:20

## 2021-11-24 RX ADMIN — Medication 1 TABLET: at 09:20

## 2021-11-24 RX ADMIN — NITROGLYCERIN 0.4 MG: 0.4 TABLET SUBLINGUAL at 20:35

## 2021-11-24 RX ADMIN — LEVOTHYROXINE SODIUM 112 MCG: 0.11 TABLET ORAL at 06:40

## 2021-11-24 RX ADMIN — NICOTINE 1 PATCH: 21 PATCH, EXTENDED RELEASE TRANSDERMAL at 09:21

## 2021-11-24 RX ADMIN — LORAZEPAM 1 MG: 1 TABLET ORAL at 12:32

## 2021-11-24 RX ADMIN — LISINOPRIL 20 MG: 20 TABLET ORAL at 20:23

## 2021-11-24 RX ADMIN — HYDRALAZINE HYDROCHLORIDE 50 MG: 50 TABLET, FILM COATED ORAL at 13:54

## 2021-11-24 RX ADMIN — HYDRALAZINE HYDROCHLORIDE 25 MG: 25 TABLET, FILM COATED ORAL at 06:40

## 2021-11-24 RX ADMIN — NITROGLYCERIN 0.4 MG: 0.4 TABLET SUBLINGUAL at 20:40

## 2021-11-24 RX ADMIN — HYDRALAZINE HYDROCHLORIDE 10 MG: 20 INJECTION INTRAMUSCULAR; INTRAVENOUS at 01:34

## 2021-11-24 RX ADMIN — NITROGLYCERIN 0.4 MG: 0.4 TABLET SUBLINGUAL at 20:30

## 2021-11-24 RX ADMIN — ENOXAPARIN SODIUM 40 MG: 40 INJECTION SUBCUTANEOUS at 09:21

## 2021-11-25 VITALS
TEMPERATURE: 97.6 F | RESPIRATION RATE: 18 BRPM | DIASTOLIC BLOOD PRESSURE: 91 MMHG | OXYGEN SATURATION: 95 % | WEIGHT: 225 LBS | HEART RATE: 119 BPM | HEIGHT: 72 IN | SYSTOLIC BLOOD PRESSURE: 151 MMHG | BODY MASS INDEX: 30.48 KG/M2

## 2021-11-25 LAB — QT INTERVAL: 320 MS

## 2021-11-25 PROCEDURE — 25010000002 KETOROLAC TROMETHAMINE PER 15 MG: Performed by: HOSPITALIST

## 2021-11-25 PROCEDURE — 99232 SBSQ HOSP IP/OBS MODERATE 35: CPT | Performed by: NURSE PRACTITIONER

## 2021-11-25 PROCEDURE — 25010000002 ENOXAPARIN PER 10 MG: Performed by: INTERNAL MEDICINE

## 2021-11-25 RX ORDER — LISINOPRIL 20 MG/1
20 TABLET ORAL EVERY 12 HOURS SCHEDULED
Qty: 60 TABLET | Refills: 0 | Status: SHIPPED | OUTPATIENT
Start: 2021-11-25

## 2021-11-25 RX ORDER — CARVEDILOL 25 MG/1
25 TABLET ORAL 2 TIMES DAILY WITH MEALS
Status: DISCONTINUED | OUTPATIENT
Start: 2021-11-25 | End: 2021-11-25 | Stop reason: HOSPADM

## 2021-11-25 RX ORDER — HYDRALAZINE HYDROCHLORIDE 50 MG/1
50 TABLET, FILM COATED ORAL EVERY 8 HOURS SCHEDULED
Qty: 90 TABLET | Refills: 0 | Status: SHIPPED | OUTPATIENT
Start: 2021-11-25

## 2021-11-25 RX ORDER — CARVEDILOL 25 MG/1
25 TABLET ORAL 2 TIMES DAILY WITH MEALS
Qty: 60 TABLET | Refills: 0 | Status: SHIPPED | OUTPATIENT
Start: 2021-11-25

## 2021-11-25 RX ORDER — LEVOTHYROXINE SODIUM 112 UG/1
112 TABLET ORAL
Qty: 30 TABLET | Refills: 0 | Status: SHIPPED | OUTPATIENT
Start: 2021-11-26

## 2021-11-25 RX ORDER — KETOROLAC TROMETHAMINE 15 MG/ML
15 INJECTION, SOLUTION INTRAMUSCULAR; INTRAVENOUS ONCE
Status: COMPLETED | OUTPATIENT
Start: 2021-11-25 | End: 2021-11-25

## 2021-11-25 RX ADMIN — ENOXAPARIN SODIUM 40 MG: 40 INJECTION SUBCUTANEOUS at 08:26

## 2021-11-25 RX ADMIN — HYDRALAZINE HYDROCHLORIDE 50 MG: 50 TABLET, FILM COATED ORAL at 06:22

## 2021-11-25 RX ADMIN — CARVEDILOL 25 MG: 25 TABLET, FILM COATED ORAL at 08:26

## 2021-11-25 RX ADMIN — KETOROLAC TROMETHAMINE 15 MG: 15 INJECTION, SOLUTION INTRAMUSCULAR; INTRAVENOUS at 11:15

## 2021-11-25 RX ADMIN — LISINOPRIL 20 MG: 20 TABLET ORAL at 08:26

## 2021-11-25 RX ADMIN — NICOTINE 1 PATCH: 21 PATCH, EXTENDED RELEASE TRANSDERMAL at 08:27

## 2021-11-25 RX ADMIN — LEVOTHYROXINE SODIUM 112 MCG: 0.11 TABLET ORAL at 06:22

## 2021-12-13 ENCOUNTER — TELEPHONE (OUTPATIENT)
Dept: CARDIOLOGY | Facility: CLINIC | Age: 46
End: 2021-12-13

## 2021-12-13 NOTE — TELEPHONE ENCOUNTER
Pt needs to r/s today's telehealth visit.  Please take him off the schedule and call/reschedule.    Thanks!  Jacklyn

## 2022-04-16 ENCOUNTER — HOSPITAL ENCOUNTER (EMERGENCY)
Facility: HOSPITAL | Age: 47
Discharge: HOME | End: 2022-04-16
Attending: EMERGENCY MEDICINE | Admitting: EMERGENCY MEDICINE

## 2022-04-16 VITALS
HEIGHT: 72 IN | OXYGEN SATURATION: 98 % | DIASTOLIC BLOOD PRESSURE: 117 MMHG | RESPIRATION RATE: 18 BRPM | HEART RATE: 118 BPM | SYSTOLIC BLOOD PRESSURE: 152 MMHG | TEMPERATURE: 98.3 F | BODY MASS INDEX: 30.52 KG/M2

## 2022-04-16 DIAGNOSIS — I10 HYPERTENSION NOT AT GOAL: ICD-10-CM

## 2022-04-16 DIAGNOSIS — R00.2 PALPITATIONS: Primary | ICD-10-CM

## 2022-04-16 LAB
AMPHET+METHAMPHET UR QL: POSITIVE
ANISOCYTOSIS BLD QL: ABNORMAL
BACTERIA UR QL AUTO: NORMAL /HPF
BARBITURATES UR QL SCN: NEGATIVE
BASOPHILS # BLD MANUAL: 0.11 10*3/MM3 (ref 0–0.2)
BASOPHILS NFR BLD MANUAL: 1 % (ref 0–1.5)
BENZODIAZ UR QL SCN: NEGATIVE
BILIRUB UR QL STRIP: NEGATIVE
BURR CELLS BLD QL SMEAR: ABNORMAL
CANNABINOIDS SERPL QL: POSITIVE
CLARITY UR: CLEAR
COCAINE UR QL: NEGATIVE
COLOR UR: YELLOW
DEPRECATED RDW RBC AUTO: 42.5 FL (ref 37–54)
EOSINOPHIL # BLD MANUAL: 0.23 10*3/MM3 (ref 0–0.4)
EOSINOPHIL NFR BLD MANUAL: 2 % (ref 0.3–6.2)
ERYTHROCYTE [DISTWIDTH] IN BLOOD BY AUTOMATED COUNT: 12.8 % (ref 12.3–15.4)
GLUCOSE UR STRIP-MCNC: NEGATIVE MG/DL
HCT VFR BLD AUTO: 47.6 % (ref 37.5–51)
HGB BLD-MCNC: 16.5 G/DL (ref 13–17.7)
HGB UR QL STRIP.AUTO: NEGATIVE
HYALINE CASTS UR QL AUTO: NORMAL /LPF
KETONES UR QL STRIP: ABNORMAL
LEUKOCYTE ESTERASE UR QL STRIP.AUTO: ABNORMAL
LYMPHOCYTES # BLD MANUAL: 2.06 10*3/MM3 (ref 0.7–3.1)
LYMPHOCYTES NFR BLD MANUAL: 3 % (ref 5–12)
MAGNESIUM SERPL-MCNC: 2.3 MG/DL (ref 1.6–2.6)
MCH RBC QN AUTO: 31.8 PG (ref 26.6–33)
MCHC RBC AUTO-ENTMCNC: 34.7 G/DL (ref 31.5–35.7)
MCV RBC AUTO: 91.7 FL (ref 79–97)
METHADONE UR QL SCN: NEGATIVE
MICROCYTES BLD QL: ABNORMAL
MONOCYTES # BLD: 0.34 10*3/MM3 (ref 0.1–0.9)
MYELOCYTES NFR BLD MANUAL: 2 % (ref 0–0)
NEUTROPHILS # BLD AUTO: 8.45 10*3/MM3 (ref 1.7–7)
NEUTROPHILS NFR BLD MANUAL: 74 % (ref 42.7–76)
NITRITE UR QL STRIP: NEGATIVE
OPIATES UR QL: NEGATIVE
OXYCODONE UR QL SCN: NEGATIVE
PH UR STRIP.AUTO: <=5 [PH] (ref 5–8)
PLAT MORPH BLD: NORMAL
PLATELET # BLD AUTO: 226 10*3/MM3 (ref 140–450)
PMV BLD AUTO: 10.2 FL (ref 6–12)
POIKILOCYTOSIS BLD QL SMEAR: ABNORMAL
PROT UR QL STRIP: ABNORMAL
RBC # BLD AUTO: 5.19 10*6/MM3 (ref 4.14–5.8)
RBC # UR STRIP: NORMAL /HPF
REF LAB TEST METHOD: NORMAL
SP GR UR STRIP: 1.03 (ref 1–1.03)
SQUAMOUS #/AREA URNS HPF: NORMAL /HPF
UROBILINOGEN UR QL STRIP: ABNORMAL
VARIANT LYMPHS NFR BLD MANUAL: 18 % (ref 19.6–45.3)
WBC # UR STRIP: NORMAL /HPF
WBC MORPH BLD: NORMAL
WBC NRBC COR # BLD: 11.42 10*3/MM3 (ref 3.4–10.8)

## 2022-04-16 PROCEDURE — 36415 COLL VENOUS BLD VENIPUNCTURE: CPT

## 2022-04-16 PROCEDURE — 85007 BL SMEAR W/DIFF WBC COUNT: CPT | Performed by: EMERGENCY MEDICINE

## 2022-04-16 PROCEDURE — 93005 ELECTROCARDIOGRAM TRACING: CPT

## 2022-04-16 PROCEDURE — 85025 COMPLETE CBC W/AUTO DIFF WBC: CPT | Performed by: EMERGENCY MEDICINE

## 2022-04-16 PROCEDURE — 80307 DRUG TEST PRSMV CHEM ANLYZR: CPT | Performed by: EMERGENCY MEDICINE

## 2022-04-16 PROCEDURE — 83735 ASSAY OF MAGNESIUM: CPT | Performed by: EMERGENCY MEDICINE

## 2022-04-16 PROCEDURE — 93005 ELECTROCARDIOGRAM TRACING: CPT | Performed by: EMERGENCY MEDICINE

## 2022-04-16 PROCEDURE — 93010 ELECTROCARDIOGRAM REPORT: CPT | Performed by: INTERNAL MEDICINE

## 2022-04-16 PROCEDURE — 81001 URINALYSIS AUTO W/SCOPE: CPT | Performed by: EMERGENCY MEDICINE

## 2022-04-16 PROCEDURE — 99283 EMERGENCY DEPT VISIT LOW MDM: CPT

## 2022-04-16 RX ORDER — METOPROLOL TARTRATE 50 MG/1
50 TABLET, FILM COATED ORAL ONCE
Status: COMPLETED | OUTPATIENT
Start: 2022-04-16 | End: 2022-04-16

## 2022-04-16 RX ORDER — CLONIDINE HYDROCHLORIDE 0.1 MG/1
0.2 TABLET ORAL ONCE
Status: COMPLETED | OUTPATIENT
Start: 2022-04-16 | End: 2022-04-16

## 2022-04-16 RX ORDER — SODIUM CHLORIDE 0.9 % (FLUSH) 0.9 %
10 SYRINGE (ML) INJECTION AS NEEDED
Status: DISCONTINUED | OUTPATIENT
Start: 2022-04-16 | End: 2022-04-16 | Stop reason: HOSPADM

## 2022-04-16 RX ADMIN — METOPROLOL TARTRATE 50 MG: 50 TABLET, FILM COATED ORAL at 06:03

## 2022-04-16 RX ADMIN — CLONIDINE HYDROCHLORIDE 0.2 MG: 0.1 TABLET ORAL at 06:03
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
## 2022-04-16 NOTE — ED NOTES
Pt now reports that he feels safe to return home and also reported this to the emergency room MD. Ok to dc home at this time, patient does not feel like he needs further treatment or any assistance with home situation.
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
## 2022-04-19 LAB — QT INTERVAL: 351 MS

## 2025-03-06 NOTE — PLAN OF CARE
Name: Poncho Wallace      : 1940      MRN: [id number]  Encounter Provider: Miranda Landa MD  Encounter Date: 3/6/2025   Encounter department: [facility]    Assessment & Plan  Primary hypertension  Blood pressure remains well-controlled off medications.  Continue to monitor  Orders:    Comprehensive metabolic panel; Future    CBC and differential; Future    Partial idiopathic epilepsy with seizures of localized onset, not intractable, with status epilepticus (HCC)  Currently follows with neurology.       Stage 3a chronic kidney disease (HCC)  Lab Results   Component Value Date    EGFR 70 2025    EGFR 58 2023    EGFR 53 08/15/2023    CREATININE 1.06 2025    CREATININE 1.17 2023    CREATININE 1.26 08/15/2023     Stable.  Avoid nephrotoxic medications.       Seizure (HCC)  Follows with neurology.  Remains on Vimpat and Keppra XR       B12 deficiency    Orders:    Vitamin B12; Future    Mixed hyperlipidemia  Controlled.  Continue simvastatin 40 mg daily       Excessive sweating  Obtain lab work listed below.  Patient notes significant sweating at night in his groin area.  Can try over-the-counter [employer] powder.  Orders:    TSH, 3rd generation with Free T4 reflex; Future    CBC and differential; Future    Benign prostatic hyperplasia with nocturia  Currently on Flomax.  Reporting urination up to 10 times a day.  Questioning whether Flomax could be causing the burning sensation in his groin.  He is going to trial off Flomax for a few weeks and see if there is any improvement.  I do recommend following up with urology as Flomax does not really appear to be helping.  Orders:    Ambulatory Referral to Urology; Future     AWV and follow-up completed today.  Reviewed previous labs.  Chronic conditions stable.  Continue current medications.  Orders above.  Preventive health issues were discussed with patient, and age appropriate screening tests were ordered as noted in  Problem: Patient Care Overview  Goal: Plan of Care Review  Outcome: Ongoing (interventions implemented as appropriate)   10/17/18 1398   Coping/Psychosocial   Plan of Care Reviewed With patient   Plan of Care Review   Progress improving   OTHER   Outcome Summary Patient denies complaints. Being discharged. Patient appointment liason page given.           patient's After Visit Summary. Personalized health advice and appropriate referrals for health education or preventive services given if needed, as noted in patient's After Visit Summary.    History of Present Illness     Patient reports groin pain worse when laying down at night.  Denies any bulges or pain when going to the bathroom.  Patient feels burning is in the bladder.  He does have an appointment with urology scheduled for next week.  He had a CT scan and a urinalysis done at [facility] in January which was relatively unremarkable.  No signs of hernia.  Urinalysis was negative no blood.  Patient feels his burning is exacerbated by sweating.  States he sleeps without underwear due to the burning and sweating.  Has not tried any over-the-counter powders.  Only present when he lays down.       Patient Care Team:  Sudheer Goncalves MD as PCP - General  MD David Chin MD W Terence Reilly, MD (Colon and Rectal Surgery)    Review of Systems   Constitutional:  Negative for activity change, fatigue and fever.   Eyes:  Negative for visual disturbance.   Respiratory:  Negative for shortness of breath.    Cardiovascular:  Negative for chest pain.   Gastrointestinal:  Negative for abdominal pain, constipation, diarrhea and nausea.   Endocrine: Negative for cold intolerance and heat intolerance.   Musculoskeletal:  Negative for back pain.   Skin:  Negative for rash.   Neurological:  Negative for headaches.   Psychiatric/Behavioral:  Negative for confusion.      Medical History Reviewed by provider this encounter:       Annual Wellness Visit Questionnaire   Poncho is here for his Subsequent Wellness visit. Last Medicare Wellness visit information reviewed, patient interviewed and updates made to the record today.      Health Risk Assessment:   Patient rates overall health as good. Patient feels that their physical health rating is slightly worse. Patient is satisfied with their life. Eyesight  was rated as slightly worse. Hearing was rated as slightly worse. Patient feels that their emotional and mental health rating is slightly worse. Patients states they are sometimes angry. Patient states they are often unusually tired/fatigued. Pain experienced in the last 7 days has been some. Patient's pain rating has been 5/10. Patient states that he has experienced no weight loss or gain in last 6 months.     Depression Screening:   PHQ-2 Score: 0      Fall Risk Screening:   In the past year, patient has experienced: no history of falling in past year      Home Safety:  Patient does not have trouble with stairs inside or outside of their home. Patient has working smoke alarms and has working carbon monoxide detector. Home safety hazards include: none.     Nutrition:   Current diet is Regular.     Medications:   Patient is currently taking over-the-counter supplements. OTC medications include: see medication list. Patient is able to manage medications.     Activities of Daily Living (ADLs)/Instrumental Activities of Daily Living (IADLs):   Walk and transfer into and out of bed and chair?: Yes  Dress and groom yourself?: Yes    Bathe or shower yourself?: Yes    Feed yourself? Yes  Do your laundry/housekeeping?: Yes  Manage your money, pay your bills and track your expenses?: Yes  Make your own meals?: Yes    Do your own shopping?: Yes    Previous Hospitalizations:   Any hospitalizations or ED visits within the last 12 months?: Yes    How many hospitalizations have you had in the last year?: 1-2    Advance Care Planning:   Living will: No    Advanced directive: No    Advanced directive counseling given: Yes    ACP document given: Yes      Comments: Voluntary Advance directive counseling completed today.  [facility] ACP document given to patient.   Discussed obtaining Living Will, Power of  and Advanced Directive.    Reviewed end-of-life decisions today with patient. Reviewed Code status option.      Time Spent: 15  minutes      PREVENTIVE SCREENINGS      Cardiovascular Screening:    General: Screening Not Indicated and History Lipid Disorder      Diabetes Screening:     General: Screening Current      Colorectal Cancer Screening:     General: Screening Not Indicated      Prostate Cancer Screening:    General: Screening Not Indicated      Abdominal Aortic Aneurysm (AAA) Screening:    Risk factors include: tobacco use        General: Screening Not Indicated and History AAA      Lung Cancer Screening:     General: Screening Not Indicated    Screening, Brief Intervention, and Referral to Treatment (SBIRT)     Screening  Typical number of drinks in a day: 0  Typical number of drinks in a week: 0  Interpretation: Low risk drinking behavior.    Single Item Drug Screening:  How often have you used an illegal drug (including marijuana) or a prescription medication for non-medical reasons in the past year? never    Single Item Drug Screen Score: 0  Interpretation: Negative screen for possible drug use disorder    Annual Depression Screening  Time spent screening and evaluating the patient for depression during today's encounter was 5 minutes.    Other Counseling Topics:   Car/seat belt/driving safety, skin self-exam, sunscreen and calcium and vitamin D intake and regular weightbearing exercise.     Social Drivers of Health     Financial Resource Strain: Low Risk  (12/18/2023)    Overall Financial Resource Strain (CARDIA)     Difficulty of Paying Living Expenses: Not hard at all   Food Insecurity: No Food Insecurity (1/19/2022)    Hunger Vital Sign     Worried About Running Out of Food in the Last Year: Never true     Ran Out of Food in the Last Year: Never true   Transportation Needs: No Transportation Needs (12/18/2023)    PRAPARE - Transportation     Lack of Transportation (Medical): No     Lack of Transportation (Non-Medical): No   Housing Stability: Low Risk  (1/19/2022)    Housing Stability Vital Sign     Unable to Pay for Housing  in the Last Year: No     Number of Places Lived in the Last Year: 1     Unstable Housing in the Last Year: No     No results found.    Objective   There were no vitals taken for this visit.    Physical Exam  Vitals and nursing note reviewed.   Constitutional:       Appearance: Normal appearance. He is well-developed.   HENT:      Head: Normocephalic and atraumatic.   Cardiovascular:      Rate and Rhythm: Normal rate and regular rhythm.   Pulmonary:      Effort: Pulmonary effort is normal.      Breath sounds: Normal breath sounds.   Abdominal:      General: Bowel sounds are normal.      Palpations: Abdomen is soft. There is no mass.      Tenderness: There is no abdominal tenderness.      Hernia: No hernia is present.   Musculoskeletal:      Cervical back: Normal range of motion.   Skin:     General: Skin is warm.   Neurological:      General: No focal deficit present.      Mental Status: He is alert.   Psychiatric:         Mood and Affect: Mood normal.         Speech: Speech normal.

## (undated) DEVICE — Device: Brand: WEBSTER CS

## (undated) DEVICE — Device

## (undated) DEVICE — ELECTRD ECG CARBON/SNP RL FM A/ 5PK

## (undated) DEVICE — ST INF PRI SMRTSTE 20DRP 2VLV 24ML 117

## (undated) DEVICE — INTRO STEER AGILIS NXT LG/CURL 8.5F71CM

## (undated) DEVICE — Device: Brand: THERMOCOOL SF NAV

## (undated) DEVICE — LOU EP: Brand: MEDLINE INDUSTRIES, INC.

## (undated) DEVICE — DECANT BG O JET

## (undated) DEVICE — AIRLIFE™ NASAL OXYGEN CANNULA CURVED, NONFLARED TIP WITH 21 FOOT (6.4 M) CRUSH-RESISTANT TUBING, OVER-THE-EAR STYLE: Brand: AIRLIFE™

## (undated) DEVICE — SYS TRNSEP ACC ACROSS ADLT BRK1 71CM

## (undated) DEVICE — Device: Brand: REFERENCE PATCH CARTO 3

## (undated) DEVICE — PREF.GUIDING SHEATH W/MULT.CRV: Brand: PREFACE

## (undated) DEVICE — STPCK 3WY STD/PRESS SWIVELNUT

## (undated) DEVICE — INTRO HEMO TRIO 12F

## (undated) DEVICE — TBG MON PRESS 36IN M TO FML

## (undated) DEVICE — Device: Brand: WEBSTER

## (undated) DEVICE — PINNACLE INTRODUCER SHEATH: Brand: PINNACLE

## (undated) DEVICE — SOL NS 500ML

## (undated) DEVICE — ST EXT IV SMARTSITE 2VLV SP M LL 5ML IV1